# Patient Record
Sex: MALE | Race: WHITE | NOT HISPANIC OR LATINO
[De-identification: names, ages, dates, MRNs, and addresses within clinical notes are randomized per-mention and may not be internally consistent; named-entity substitution may affect disease eponyms.]

---

## 2017-01-09 ENCOUNTER — APPOINTMENT (OUTPATIENT)
Dept: OTOLARYNGOLOGY | Facility: CLINIC | Age: 42
End: 2017-01-09

## 2017-09-25 ENCOUNTER — APPOINTMENT (OUTPATIENT)
Dept: OTOLARYNGOLOGY | Facility: CLINIC | Age: 42
End: 2017-09-25
Payer: COMMERCIAL

## 2017-09-25 VITALS — DIASTOLIC BLOOD PRESSURE: 81 MMHG | OXYGEN SATURATION: 99 % | HEART RATE: 71 BPM | SYSTOLIC BLOOD PRESSURE: 118 MMHG

## 2017-09-25 PROCEDURE — 92557 COMPREHENSIVE HEARING TEST: CPT

## 2017-09-25 PROCEDURE — 92550 TYMPANOMETRY & REFLEX THRESH: CPT

## 2017-09-25 PROCEDURE — 99214 OFFICE O/P EST MOD 30 MIN: CPT

## 2019-01-23 ENCOUNTER — APPOINTMENT (OUTPATIENT)
Dept: OTOLARYNGOLOGY | Facility: CLINIC | Age: 44
End: 2019-01-23

## 2019-05-15 ENCOUNTER — APPOINTMENT (OUTPATIENT)
Dept: OTOLARYNGOLOGY | Facility: CLINIC | Age: 44
End: 2019-05-15
Payer: COMMERCIAL

## 2019-05-15 DIAGNOSIS — Z87.891 PERSONAL HISTORY OF NICOTINE DEPENDENCE: ICD-10-CM

## 2019-05-15 PROCEDURE — 99214 OFFICE O/P EST MOD 30 MIN: CPT | Mod: 25

## 2019-05-15 PROCEDURE — 92557 COMPREHENSIVE HEARING TEST: CPT

## 2019-05-15 PROCEDURE — 92567 TYMPANOMETRY: CPT

## 2019-06-04 NOTE — HISTORY OF PRESENT ILLNESS
[de-identified] : 43 yr old self-referred with meneires  - has had since age 7 - in left ear initially - went back and forth - several years ago episodes started in right ear - 2 months ago vertigo (4 episodes in past 10 years) - usually only 1x per year (6 hours - violently ill).  Hearing usually flux w/o vertigo - this past winter hearing up and down - also has allergies - uses allerga which helps - had allergy testing 10 years ago - stopped using steroids for hearing flux 6 years  - no hearing aid use - today is a good day for hearing.  No family hx of HL, vertigo, or AI disease.

## 2019-06-04 NOTE — DATA REVIEWED
[de-identified] : stable unilateral sensorineural hearing loss\par  [de-identified] : Ousmane left intracanilicular vestibular schwannoma last imaging 2017

## 2019-07-16 ENCOUNTER — APPOINTMENT (OUTPATIENT)
Dept: OTOLARYNGOLOGY | Facility: CLINIC | Age: 44
End: 2019-07-16
Payer: COMMERCIAL

## 2019-07-16 PROCEDURE — 92557 COMPREHENSIVE HEARING TEST: CPT

## 2019-07-16 PROCEDURE — 92567 TYMPANOMETRY: CPT

## 2019-10-24 ENCOUNTER — TRANSCRIPTION ENCOUNTER (OUTPATIENT)
Age: 44
End: 2019-10-24

## 2019-11-13 ENCOUNTER — APPOINTMENT (OUTPATIENT)
Dept: OTOLARYNGOLOGY | Facility: CLINIC | Age: 44
End: 2019-11-13
Payer: COMMERCIAL

## 2019-11-13 ENCOUNTER — TRANSCRIPTION ENCOUNTER (OUTPATIENT)
Age: 44
End: 2019-11-13

## 2019-11-13 PROCEDURE — 92557 COMPREHENSIVE HEARING TEST: CPT

## 2019-11-13 PROCEDURE — 99214 OFFICE O/P EST MOD 30 MIN: CPT | Mod: 25

## 2019-11-13 PROCEDURE — 92567 TYMPANOMETRY: CPT

## 2019-12-04 ENCOUNTER — APPOINTMENT (OUTPATIENT)
Dept: OTOLARYNGOLOGY | Facility: CLINIC | Age: 44
End: 2019-12-04
Payer: COMMERCIAL

## 2019-12-04 VITALS
DIASTOLIC BLOOD PRESSURE: 71 MMHG | HEART RATE: 71 BPM | SYSTOLIC BLOOD PRESSURE: 104 MMHG | BODY MASS INDEX: 24.34 KG/M2 | HEIGHT: 70 IN | WEIGHT: 170 LBS

## 2019-12-04 PROCEDURE — 92567 TYMPANOMETRY: CPT

## 2019-12-04 PROCEDURE — 92557 COMPREHENSIVE HEARING TEST: CPT

## 2019-12-04 PROCEDURE — 99214 OFFICE O/P EST MOD 30 MIN: CPT | Mod: 25

## 2019-12-04 RX ORDER — PREDNISONE 10 MG/1
10 TABLET ORAL
Qty: 70 | Refills: 0 | Status: DISCONTINUED | COMMUNITY
Start: 2019-11-13 | End: 2019-12-04

## 2019-12-15 NOTE — PHYSICAL EXAM
[Midline] : trachea located in midline position [Normal] : ocular motility and gaze are normal [de-identified] : right gaze nysyagmus

## 2019-12-15 NOTE — HISTORY OF PRESENT ILLNESS
[de-identified] : 44M here for f/u for Menieres. Pt with h/o unilateral sensorineural hearing loss and vestibular schwannoma -had- allergy eval for Meniere's Disease- triggers for Spring possible (pollen, grass). Having vertigo and drop in hearing in past 6 weeks- was on dyazide in past - ? benefit - no headaches - vertigo daily - couldn't walk straight or be upright. \par

## 2019-12-15 NOTE — HISTORY OF PRESENT ILLNESS
[de-identified] : 44M here for f/u for Menieres. Pt with h/o unilateral sensorineural hearing loss and left vestibular schwannoma -has had allergy eval for Meniere's Disease- pet allergy noted. S/p prednisone- finished course yesterday- hearing has been fluctuating- hearing currently is the worst it has ever been. Pt feels lightheaded now- tinnitus is worse now- feels a dial tone in the right ear. Pt denies any changes to the hearing in the left ear. Pt states he takes diuretic as prescribed. \par \par

## 2019-12-15 NOTE — DATA REVIEWED
[de-identified] : no change in left intracannilicular vestibular schwannoma  [de-identified] : worsening asymmetric SNHL

## 2020-01-02 ENCOUNTER — APPOINTMENT (OUTPATIENT)
Dept: OTOLARYNGOLOGY | Facility: CLINIC | Age: 45
End: 2020-01-02
Payer: COMMERCIAL

## 2020-01-02 PROCEDURE — 92557 COMPREHENSIVE HEARING TEST: CPT

## 2020-01-02 PROCEDURE — 99214 OFFICE O/P EST MOD 30 MIN: CPT | Mod: 25

## 2020-01-02 PROCEDURE — 92567 TYMPANOMETRY: CPT

## 2020-01-20 NOTE — HISTORY OF PRESENT ILLNESS
[de-identified] : 44M here for f/u for Menieres. Pt with h/o unilateral sensorineural hearing loss and left vestibular schwannoma. Pt f/u post steroid taper (finished today) due to Right ear with worse HL - MRI shows stable left schwannoma - continues on Dyazide.  \par \par Pt notes improvement in hearing- back to baseline. Pt notes no new vertigo since last visit.

## 2020-02-12 ENCOUNTER — APPOINTMENT (OUTPATIENT)
Dept: OTOLARYNGOLOGY | Facility: CLINIC | Age: 45
End: 2020-02-12
Payer: COMMERCIAL

## 2020-02-12 PROCEDURE — 92557 COMPREHENSIVE HEARING TEST: CPT

## 2020-02-12 PROCEDURE — 99214 OFFICE O/P EST MOD 30 MIN: CPT | Mod: 25

## 2020-02-12 PROCEDURE — 92567 TYMPANOMETRY: CPT

## 2020-02-25 ENCOUNTER — APPOINTMENT (OUTPATIENT)
Dept: OTOLARYNGOLOGY | Facility: CLINIC | Age: 45
End: 2020-02-25
Payer: COMMERCIAL

## 2020-02-25 PROCEDURE — 92557 COMPREHENSIVE HEARING TEST: CPT

## 2020-02-25 PROCEDURE — 92567 TYMPANOMETRY: CPT

## 2020-03-05 NOTE — DATA REVIEWED
[de-identified] : Otoscopy unremarkable Au. Pitch match: 45dBHL of pure tone at 500Hz in the right ear.\par Right ear: Moderate SNHL rising to normal hearing \par Left ear: Essentially mild to severe/moderately severe SNHL\par drop in hearing AD low frequencies\par Normal Type A tympanograms Au\par drop in hearing AD low frequency

## 2020-03-05 NOTE — HISTORY OF PRESENT ILLNESS
[de-identified] : 44M here for f/u for Menieres. Pt with h/o unilateral sensorineural hearing loss and left vestibular schwannoma. Pt continues on Dyazide and clonazepam (as needed).  Went skiing over weekend- drank a lot and sx worse - woke up with hum Monday morning - today better

## 2020-03-25 ENCOUNTER — APPOINTMENT (OUTPATIENT)
Dept: OTOLARYNGOLOGY | Facility: CLINIC | Age: 45
End: 2020-03-25
Payer: COMMERCIAL

## 2020-03-25 PROCEDURE — 99214 OFFICE O/P EST MOD 30 MIN: CPT | Mod: 25

## 2020-03-25 PROCEDURE — 92557 COMPREHENSIVE HEARING TEST: CPT

## 2020-03-25 PROCEDURE — 92567 TYMPANOMETRY: CPT

## 2020-03-25 NOTE — HISTORY OF PRESENT ILLNESS
[de-identified] : Loud tinnitus AD- finishing prednisone now - no vertigo - hearing worse - still on diuretic - has not d/c'd

## 2020-03-25 NOTE — DATA REVIEWED
[de-identified] : Right- rising moderate SNHL through 750Hz to WNL thereafter\par Left- -mild to severe sensorineural hearing loss\par Impedance testing reveals normal Type A tympanograms bilaterally\par no improvement AD\par \par \par \par

## 2020-04-06 ENCOUNTER — APPOINTMENT (OUTPATIENT)
Dept: OTOLARYNGOLOGY | Facility: CLINIC | Age: 45
End: 2020-04-06
Payer: COMMERCIAL

## 2020-04-06 VITALS — SYSTOLIC BLOOD PRESSURE: 128 MMHG | DIASTOLIC BLOOD PRESSURE: 76 MMHG | HEART RATE: 91 BPM

## 2020-04-06 VITALS — TEMPERATURE: 98.5 F

## 2020-04-06 PROCEDURE — 92557 COMPREHENSIVE HEARING TEST: CPT

## 2020-04-06 PROCEDURE — 92567 TYMPANOMETRY: CPT

## 2020-04-06 PROCEDURE — 99213 OFFICE O/P EST LOW 20 MIN: CPT | Mod: 25

## 2020-04-07 NOTE — DATA REVIEWED
[de-identified] : Mild SNHL to 500Hz rising to WNL from 750Hz-8kHz, AD\par Essentially mild sloping to moderate-severe SNHL, AS\par improved AD

## 2020-04-07 NOTE — HISTORY OF PRESENT ILLNESS
[de-identified] : 44M here for f/u for hearing- pt continues on longer prednisone taper- having loud tinnitus AD, didn't feel hearing was better post prednisone during last office visit. Pt continues on Dyazide. Pt notes hearing is fluctuating still- worst when waking up- ok to fair during the day time. Pt feels overall that hearing is better from 10 days ago. Pt denies any vertigo.

## 2020-04-07 NOTE — DATA REVIEWED
[de-identified] : Mild SNHL to 500Hz rising to WNL from 750Hz-8kHz, AD\par Essentially mild sloping to moderate-severe SNHL, AS\par improved AD

## 2020-04-07 NOTE — HISTORY OF PRESENT ILLNESS
[de-identified] : 44M here for f/u for hearing- pt continues on longer prednisone taper- having loud tinnitus AD, didn't feel hearing was better post prednisone during last office visit. Pt continues on Dyazide. Pt notes hearing is fluctuating still- worst when waking up- ok to fair during the day time. Pt feels overall that hearing is better from 10 days ago. Pt denies any vertigo.

## 2020-04-27 ENCOUNTER — APPOINTMENT (OUTPATIENT)
Dept: OTOLARYNGOLOGY | Facility: CLINIC | Age: 45
End: 2020-04-27
Payer: COMMERCIAL

## 2020-04-27 VITALS — TEMPERATURE: 98 F

## 2020-04-27 PROCEDURE — 92557 COMPREHENSIVE HEARING TEST: CPT

## 2020-04-27 PROCEDURE — 92567 TYMPANOMETRY: CPT

## 2020-04-27 PROCEDURE — 99213 OFFICE O/P EST LOW 20 MIN: CPT | Mod: 25

## 2020-06-23 ENCOUNTER — TRANSCRIPTION ENCOUNTER (OUTPATIENT)
Age: 45
End: 2020-06-23

## 2020-06-24 ENCOUNTER — TRANSCRIPTION ENCOUNTER (OUTPATIENT)
Age: 45
End: 2020-06-24

## 2020-07-07 ENCOUNTER — TRANSCRIPTION ENCOUNTER (OUTPATIENT)
Age: 45
End: 2020-07-07

## 2020-07-09 ENCOUNTER — APPOINTMENT (OUTPATIENT)
Dept: OTOLARYNGOLOGY | Facility: CLINIC | Age: 45
End: 2020-07-09
Payer: COMMERCIAL

## 2020-07-09 VITALS — HEART RATE: 96 BPM | SYSTOLIC BLOOD PRESSURE: 113 MMHG | DIASTOLIC BLOOD PRESSURE: 77 MMHG

## 2020-07-09 PROCEDURE — 92567 TYMPANOMETRY: CPT

## 2020-07-09 PROCEDURE — 92557 COMPREHENSIVE HEARING TEST: CPT

## 2020-07-09 PROCEDURE — 99214 OFFICE O/P EST MOD 30 MIN: CPT | Mod: 25

## 2020-07-16 ENCOUNTER — TRANSCRIPTION ENCOUNTER (OUTPATIENT)
Age: 45
End: 2020-07-16

## 2020-07-20 NOTE — DATA REVIEWED
[de-identified] : Right ear: Moderate SNHL rising to normal hearing from 250Hz-8kHz\par Left ear: Essentially moderate to severe SNHL from 250Hz-8kHz\par Normal Type A tymps Au

## 2020-07-20 NOTE — HISTORY OF PRESENT ILLNESS
[de-identified] : 44M here for f/u for hearing- hx of Menieres- continues on Dyazide - feel hearing is ok- hum that started on Saturday- due to stress- denies any vertigo. Stopped allegra and changed to claritin - now new hum AD - no vertigo -

## 2020-08-05 ENCOUNTER — APPOINTMENT (OUTPATIENT)
Dept: OTOLARYNGOLOGY | Facility: CLINIC | Age: 45
End: 2020-08-05
Payer: COMMERCIAL

## 2020-08-05 VITALS — SYSTOLIC BLOOD PRESSURE: 103 MMHG | DIASTOLIC BLOOD PRESSURE: 67 MMHG | HEART RATE: 74 BPM

## 2020-08-05 PROCEDURE — 99213 OFFICE O/P EST LOW 20 MIN: CPT | Mod: 25

## 2020-08-05 PROCEDURE — 92557 COMPREHENSIVE HEARING TEST: CPT

## 2020-08-05 PROCEDURE — 92567 TYMPANOMETRY: CPT

## 2020-08-05 RX ORDER — PREDNISONE 10 MG/1
10 TABLET ORAL
Qty: 120 | Refills: 0 | Status: DISCONTINUED | COMMUNITY
Start: 2020-02-26 | End: 2020-08-05

## 2020-08-05 RX ORDER — PREDNISONE 10 MG/1
10 TABLET ORAL
Qty: 120 | Refills: 0 | Status: DISCONTINUED | COMMUNITY
Start: 2020-03-25 | End: 2020-08-05

## 2020-08-05 RX ORDER — PREDNISONE 10 MG/1
10 TABLET ORAL
Qty: 70 | Refills: 1 | Status: DISCONTINUED | COMMUNITY
Start: 2020-07-09 | End: 2020-08-05

## 2020-08-05 RX ORDER — PREDNISONE 10 MG/1
10 TABLET ORAL
Qty: 120 | Refills: 0 | Status: DISCONTINUED | COMMUNITY
Start: 2019-12-04 | End: 2020-08-05

## 2020-08-05 RX ORDER — PREDNISONE 10 MG/1
10 TABLET ORAL
Qty: 70 | Refills: 0 | Status: DISCONTINUED | COMMUNITY
Start: 2020-02-12 | End: 2020-08-05

## 2020-08-05 NOTE — HISTORY OF PRESENT ILLNESS
[de-identified] : 45M here for hearing- feels hearing is stable- denies otalgia, otorrhea, ear infections or vertigo. \par

## 2020-08-05 NOTE — DATA REVIEWED
[de-identified] : RT: WNL with excellent SRS\par LT: mild/WNL to mod-severe SNHL with good SRS\par Normal type A tymps bilaterally

## 2020-11-11 ENCOUNTER — APPOINTMENT (OUTPATIENT)
Dept: OTOLARYNGOLOGY | Facility: CLINIC | Age: 45
End: 2020-11-11
Payer: COMMERCIAL

## 2020-11-11 PROCEDURE — 99072 ADDL SUPL MATRL&STAF TM PHE: CPT

## 2020-11-11 PROCEDURE — 99214 OFFICE O/P EST MOD 30 MIN: CPT | Mod: 25

## 2020-11-11 PROCEDURE — 92567 TYMPANOMETRY: CPT

## 2020-11-11 PROCEDURE — 92557 COMPREHENSIVE HEARING TEST: CPT

## 2020-11-12 NOTE — DATA REVIEWED
[de-identified] : Mild to moderately-severe sensorineural hearing loss with the exception of normal hearing at 500Hz and 750Hz, AS. Hearing within normal limits, AD. [de-identified] : [id number]

## 2021-02-12 ENCOUNTER — OUTPATIENT (OUTPATIENT)
Dept: OUTPATIENT SERVICES | Facility: HOSPITAL | Age: 46
LOS: 1 days | End: 2021-02-12
Payer: COMMERCIAL

## 2021-02-12 DIAGNOSIS — Z20.828 CONTACT WITH AND (SUSPECTED) EXPOSURE TO OTHER VIRAL COMMUNICABLE DISEASES: ICD-10-CM

## 2021-02-12 LAB — SARS-COV-2 RNA SPEC QL NAA+PROBE: SIGNIFICANT CHANGE UP

## 2021-02-12 PROCEDURE — U0003: CPT

## 2021-02-12 PROCEDURE — C9803: CPT

## 2021-02-12 PROCEDURE — U0005: CPT

## 2021-02-13 DIAGNOSIS — Z20.828 CONTACT WITH AND (SUSPECTED) EXPOSURE TO OTHER VIRAL COMMUNICABLE DISEASES: ICD-10-CM

## 2021-02-24 ENCOUNTER — APPOINTMENT (OUTPATIENT)
Dept: OTOLARYNGOLOGY | Facility: CLINIC | Age: 46
End: 2021-02-24
Payer: COMMERCIAL

## 2021-02-24 PROCEDURE — 99072 ADDL SUPL MATRL&STAF TM PHE: CPT

## 2021-02-24 PROCEDURE — 92567 TYMPANOMETRY: CPT

## 2021-02-24 PROCEDURE — 92557 COMPREHENSIVE HEARING TEST: CPT

## 2021-02-24 NOTE — HISTORY OF PRESENT ILLNESS
[de-identified] : 45M f/u for hearing-  hx of left VS and SSNHL in right ear- continues with Dyazide???  Had episode of vertigo following 103 fever.

## 2021-03-15 ENCOUNTER — TRANSCRIPTION ENCOUNTER (OUTPATIENT)
Age: 46
End: 2021-03-15

## 2021-03-24 ENCOUNTER — TRANSCRIPTION ENCOUNTER (OUTPATIENT)
Age: 46
End: 2021-03-24

## 2021-04-06 ENCOUNTER — TRANSCRIPTION ENCOUNTER (OUTPATIENT)
Age: 46
End: 2021-04-06

## 2021-04-07 ENCOUNTER — NON-APPOINTMENT (OUTPATIENT)
Age: 46
End: 2021-04-07

## 2021-04-07 ENCOUNTER — APPOINTMENT (OUTPATIENT)
Dept: OTOLARYNGOLOGY | Facility: CLINIC | Age: 46
End: 2021-04-07

## 2021-05-10 ENCOUNTER — APPOINTMENT (OUTPATIENT)
Dept: OTOLARYNGOLOGY | Facility: CLINIC | Age: 46
End: 2021-05-10
Payer: COMMERCIAL

## 2021-05-10 VITALS — DIASTOLIC BLOOD PRESSURE: 67 MMHG | HEART RATE: 73 BPM | SYSTOLIC BLOOD PRESSURE: 100 MMHG

## 2021-05-10 DIAGNOSIS — J30.1 ALLERGIC RHINITIS DUE TO POLLEN: ICD-10-CM

## 2021-05-10 PROCEDURE — 92557 COMPREHENSIVE HEARING TEST: CPT

## 2021-05-10 PROCEDURE — 92567 TYMPANOMETRY: CPT

## 2021-05-10 PROCEDURE — 99213 OFFICE O/P EST LOW 20 MIN: CPT | Mod: 25

## 2021-05-10 PROCEDURE — 99072 ADDL SUPL MATRL&STAF TM PHE: CPT

## 2021-05-10 RX ORDER — PREDNISONE 10 MG/1
10 TABLET ORAL
Qty: 70 | Refills: 0 | Status: COMPLETED | COMMUNITY
Start: 2021-04-07 | End: 2021-05-10

## 2021-05-18 ENCOUNTER — APPOINTMENT (OUTPATIENT)
Dept: OTOLARYNGOLOGY | Facility: CLINIC | Age: 46
End: 2021-05-18
Payer: COMMERCIAL

## 2021-05-18 PROCEDURE — 92557 COMPREHENSIVE HEARING TEST: CPT

## 2021-05-18 PROCEDURE — 99072 ADDL SUPL MATRL&STAF TM PHE: CPT

## 2021-05-18 PROCEDURE — 92567 TYMPANOMETRY: CPT

## 2021-05-27 NOTE — HISTORY OF PRESENT ILLNESS
[de-identified] : 45M f/u for hearing drop 7 days post 2nd covid vaccine (Pfizer) and right tinnitus AD - started prednisone taper- finished about 2 weeks ago- notes improvement in hearing- notes low-hum in right ear (always present for the last week)- feels allergies are the worst this season- just taking Allegra. Continues with Dyazide and NAC. Feels back to baseline except allergies

## 2021-07-19 ENCOUNTER — APPOINTMENT (OUTPATIENT)
Dept: OTOLARYNGOLOGY | Facility: CLINIC | Age: 46
End: 2021-07-19
Payer: COMMERCIAL

## 2021-07-19 VITALS — SYSTOLIC BLOOD PRESSURE: 125 MMHG | HEART RATE: 82 BPM | DIASTOLIC BLOOD PRESSURE: 88 MMHG

## 2021-07-19 PROCEDURE — 92557 COMPREHENSIVE HEARING TEST: CPT

## 2021-07-19 PROCEDURE — 99214 OFFICE O/P EST MOD 30 MIN: CPT | Mod: 25

## 2021-07-19 PROCEDURE — 99072 ADDL SUPL MATRL&STAF TM PHE: CPT

## 2021-07-19 PROCEDURE — 92567 TYMPANOMETRY: CPT

## 2021-07-19 RX ORDER — MONTELUKAST 10 MG/1
10 TABLET, FILM COATED ORAL DAILY
Qty: 90 | Refills: 3 | Status: DISCONTINUED | COMMUNITY
Start: 2021-05-10 | End: 2021-07-19

## 2021-07-19 NOTE — DATA REVIEWED
[de-identified] : AS: Essentially mild to moderately severe SNHL\par AD: Moderate SNHL rising to WNL (decrease in low freq since previous eval)

## 2021-08-04 ENCOUNTER — APPOINTMENT (OUTPATIENT)
Dept: OTOLARYNGOLOGY | Facility: CLINIC | Age: 46
End: 2021-08-04
Payer: COMMERCIAL

## 2021-08-04 PROCEDURE — 99213 OFFICE O/P EST LOW 20 MIN: CPT | Mod: 25

## 2021-08-04 PROCEDURE — 92567 TYMPANOMETRY: CPT

## 2021-08-04 PROCEDURE — 92557 COMPREHENSIVE HEARING TEST: CPT

## 2021-08-10 ENCOUNTER — NON-APPOINTMENT (OUTPATIENT)
Age: 46
End: 2021-08-10

## 2021-08-10 LAB
ANION GAP SERPL CALC-SCNC: 15 MMOL/L
BUN SERPL-MCNC: 22 MG/DL
CALCIUM SERPL-MCNC: 10 MG/DL
CHLORIDE SERPL-SCNC: 100 MMOL/L
CO2 SERPL-SCNC: 25 MMOL/L
CREAT SERPL-MCNC: 1.09 MG/DL
GLUCOSE SERPL-MCNC: 53 MG/DL
POTASSIUM SERPL-SCNC: 4.1 MMOL/L
SODIUM SERPL-SCNC: 140 MMOL/L
TRIGL SERPL-MCNC: 462 MG/DL

## 2021-08-19 NOTE — HISTORY OF PRESENT ILLNESS
[de-identified] : 46M f/u for right SSNHL - hx of left vestibular schwannoma- trial of prednisone  frequent nausea - every couple of days - continues on Dyazide - [id number]

## 2021-09-07 ENCOUNTER — RX RENEWAL (OUTPATIENT)
Age: 46
End: 2021-09-07

## 2021-11-17 ENCOUNTER — APPOINTMENT (OUTPATIENT)
Dept: OTOLARYNGOLOGY | Facility: CLINIC | Age: 46
End: 2021-11-17
Payer: COMMERCIAL

## 2021-11-17 VITALS — HEART RATE: 67 BPM | DIASTOLIC BLOOD PRESSURE: 74 MMHG | SYSTOLIC BLOOD PRESSURE: 107 MMHG

## 2021-11-17 PROCEDURE — 92567 TYMPANOMETRY: CPT

## 2021-11-17 PROCEDURE — 92557 COMPREHENSIVE HEARING TEST: CPT

## 2021-11-17 PROCEDURE — 99214 OFFICE O/P EST MOD 30 MIN: CPT | Mod: 25

## 2021-11-17 RX ORDER — PREDNISONE 10 MG/1
10 TABLET ORAL
Qty: 70 | Refills: 0 | Status: COMPLETED | COMMUNITY
Start: 2021-07-19 | End: 2021-11-17

## 2021-11-24 ENCOUNTER — NON-APPOINTMENT (OUTPATIENT)
Age: 46
End: 2021-11-24

## 2021-11-24 ENCOUNTER — TRANSCRIPTION ENCOUNTER (OUTPATIENT)
Age: 46
End: 2021-11-24

## 2021-12-01 ENCOUNTER — NON-APPOINTMENT (OUTPATIENT)
Age: 46
End: 2021-12-01

## 2021-12-01 ENCOUNTER — TRANSCRIPTION ENCOUNTER (OUTPATIENT)
Age: 46
End: 2021-12-01

## 2021-12-02 ENCOUNTER — TRANSCRIPTION ENCOUNTER (OUTPATIENT)
Age: 46
End: 2021-12-02

## 2021-12-04 NOTE — HISTORY OF PRESENT ILLNESS
[de-identified] : 45 yr old returns for follow up - hx of left VS and SSNHL in right ear-  since last visit in August no noticeable change in hearing- no c/o vertigo.  
1% lidocaine

## 2021-12-08 ENCOUNTER — APPOINTMENT (OUTPATIENT)
Dept: OTOLARYNGOLOGY | Facility: CLINIC | Age: 46
End: 2021-12-08

## 2021-12-08 ENCOUNTER — APPOINTMENT (OUTPATIENT)
Dept: OTOLARYNGOLOGY | Facility: CLINIC | Age: 46
End: 2021-12-08
Payer: COMMERCIAL

## 2021-12-08 VITALS
SYSTOLIC BLOOD PRESSURE: 122 MMHG | DIASTOLIC BLOOD PRESSURE: 85 MMHG | BODY MASS INDEX: 25.05 KG/M2 | WEIGHT: 175 LBS | HEART RATE: 64 BPM | HEIGHT: 70 IN

## 2021-12-08 DIAGNOSIS — H90.41 SENSORINEURAL HEARING LOSS, UNILATERAL, RIGHT EAR, WITH UNRESTRICTED HEARING ON THE CONTRALATERAL SIDE: ICD-10-CM

## 2021-12-08 PROCEDURE — 92567 TYMPANOMETRY: CPT

## 2021-12-08 PROCEDURE — 92557 COMPREHENSIVE HEARING TEST: CPT

## 2021-12-08 PROCEDURE — 99213 OFFICE O/P EST LOW 20 MIN: CPT | Mod: 25

## 2021-12-08 NOTE — HISTORY OF PRESENT ILLNESS
[de-identified] : 46 year old male follow up for Right sudden SNHL, s/p oral Prednisone taper, restarted on betahistine, steroid taper extended, taking medications as prescribed with good relief.  Reports doing much better, hearing fully restored.  No change with tinnitus, mild and non bothersome.  Denies otalgia, otorrhea, dizziness, vertigo, headaches, recent fevers and ear infections.  Denies severe side effects with steroid regimen.

## 2021-12-08 NOTE — HISTORY OF PRESENT ILLNESS
[de-identified] : 46M f/u for right SSNHL - hx of left vestibular schwannoma- added BetaHistine- tried it for 2 months but came off it - not nauseous anymore- votes having taken COVID-19 booster 2 weeks and after started having loud noise in the right ear- stress is manageable - continues on Dyazide and NAC.

## 2021-12-08 NOTE — DATA REVIEWED
[de-identified] : Moderate-severe rising to mild SNHL to 1kHz rising to WNL through 8kHz, AD\par Essentially mild to severe SNHL, AS\par Type A tymp, AU

## 2021-12-08 NOTE — DATA REVIEWED
[de-identified] : Right- hearing WNL\par Left- -mild to severe sensorineural hearing loss\par Impedance testing reveals normal Type A tympanograms bilaterally\par

## 2022-01-06 ENCOUNTER — APPOINTMENT (OUTPATIENT)
Dept: OTOLARYNGOLOGY | Facility: CLINIC | Age: 47
End: 2022-01-06
Payer: COMMERCIAL

## 2022-01-06 PROCEDURE — 99214 OFFICE O/P EST MOD 30 MIN: CPT | Mod: 25

## 2022-01-06 PROCEDURE — 92557 COMPREHENSIVE HEARING TEST: CPT

## 2022-01-06 PROCEDURE — 92567 TYMPANOMETRY: CPT

## 2022-01-09 NOTE — HISTORY OF PRESENT ILLNESS
[de-identified] : Patient recently noticed drop in hearing again - likely related to diet over holidays - denies vertigo

## 2022-01-09 NOTE — DATA REVIEWED
[de-identified] : right- moderate, rising to a mild sensorineural hearing loss through 750Hz to WNL thereafter\par Left- -mild to severe sensorineural hearing loss\par Impedance testing reveals normal Type A tympanograms bilaterally\par

## 2022-01-11 ENCOUNTER — TRANSCRIPTION ENCOUNTER (OUTPATIENT)
Age: 47
End: 2022-01-11

## 2022-01-13 ENCOUNTER — TRANSCRIPTION ENCOUNTER (OUTPATIENT)
Age: 47
End: 2022-01-13

## 2022-01-26 ENCOUNTER — APPOINTMENT (OUTPATIENT)
Dept: OTOLARYNGOLOGY | Facility: CLINIC | Age: 47
End: 2022-01-26
Payer: COMMERCIAL

## 2022-01-26 VITALS
SYSTOLIC BLOOD PRESSURE: 114 MMHG | DIASTOLIC BLOOD PRESSURE: 76 MMHG | HEART RATE: 89 BPM | HEIGHT: 70 IN | BODY MASS INDEX: 25.05 KG/M2 | WEIGHT: 175 LBS

## 2022-01-26 PROCEDURE — 92557 COMPREHENSIVE HEARING TEST: CPT

## 2022-01-26 PROCEDURE — 92567 TYMPANOMETRY: CPT

## 2022-01-26 PROCEDURE — 92625 TINNITUS ASSESSMENT: CPT

## 2022-01-26 PROCEDURE — 99214 OFFICE O/P EST MOD 30 MIN: CPT

## 2022-01-28 ENCOUNTER — RESULT REVIEW (OUTPATIENT)
Age: 47
End: 2022-01-28

## 2022-02-02 ENCOUNTER — APPOINTMENT (OUTPATIENT)
Dept: MRI IMAGING | Facility: CLINIC | Age: 47
End: 2022-02-02
Payer: COMMERCIAL

## 2022-02-02 ENCOUNTER — OUTPATIENT (OUTPATIENT)
Dept: OUTPATIENT SERVICES | Facility: HOSPITAL | Age: 47
LOS: 1 days | End: 2022-02-02
Payer: COMMERCIAL

## 2022-02-02 DIAGNOSIS — H90.5 UNSPECIFIED SENSORINEURAL HEARING LOSS: ICD-10-CM

## 2022-02-02 DIAGNOSIS — H93.A3 PULSATILE TINNITUS, BILATERAL: ICD-10-CM

## 2022-02-02 DIAGNOSIS — H93.11 TINNITUS, RIGHT EAR: ICD-10-CM

## 2022-02-02 PROCEDURE — 70546 MR ANGIOGRAPH HEAD W/O&W/DYE: CPT

## 2022-02-02 PROCEDURE — 70546 MR ANGIOGRAPH HEAD W/O&W/DYE: CPT | Mod: 26,59

## 2022-02-02 PROCEDURE — A9585: CPT

## 2022-02-02 PROCEDURE — 70553 MRI BRAIN STEM W/O & W/DYE: CPT

## 2022-02-02 PROCEDURE — 70553 MRI BRAIN STEM W/O & W/DYE: CPT | Mod: 26

## 2022-02-06 NOTE — HISTORY OF PRESENT ILLNESS
[de-identified] : 46 year old male follow up hearing loss. Feels it is worse than ever before tinnitus worse than ever.  Nausea, but no vertigo - ho headache.  + fatigue.

## 2022-02-06 NOTE — DATA REVIEWED
[de-identified] : RE: Mild SNHL at 250 Hz, rising to hearing WNL.\par LE: Essentially a mild to moderately severe SNHL.\par Type A Tymp, Au.

## 2022-02-09 ENCOUNTER — NON-APPOINTMENT (OUTPATIENT)
Age: 47
End: 2022-02-09

## 2022-02-14 ENCOUNTER — NON-APPOINTMENT (OUTPATIENT)
Age: 47
End: 2022-02-14

## 2022-03-09 ENCOUNTER — APPOINTMENT (OUTPATIENT)
Dept: OTOLARYNGOLOGY | Facility: CLINIC | Age: 47
End: 2022-03-09
Payer: COMMERCIAL

## 2022-03-09 VITALS
WEIGHT: 175 LBS | HEIGHT: 70 IN | SYSTOLIC BLOOD PRESSURE: 128 MMHG | BODY MASS INDEX: 25.05 KG/M2 | HEART RATE: 77 BPM | DIASTOLIC BLOOD PRESSURE: 83 MMHG

## 2022-03-09 PROCEDURE — 92557 COMPREHENSIVE HEARING TEST: CPT

## 2022-03-09 PROCEDURE — 99213 OFFICE O/P EST LOW 20 MIN: CPT

## 2022-03-09 PROCEDURE — 92567 TYMPANOMETRY: CPT

## 2022-03-09 PROCEDURE — 92584 ELECTROCOCHLEOGRAPHY: CPT

## 2022-03-09 RX ORDER — ACETYLCYSTEINE 600 MG
600 CAPSULE ORAL DAILY
Qty: 90 | Refills: 3 | Status: COMPLETED | COMMUNITY
Start: 2019-12-04 | End: 2022-03-09

## 2022-03-09 RX ORDER — ACETAZOLAMIDE 125 MG/1
125 TABLET ORAL
Qty: 28 | Refills: 0 | Status: COMPLETED | COMMUNITY
Start: 2022-02-09 | End: 2022-03-09

## 2022-03-09 RX ORDER — PREDNISONE 10 MG/1
10 TABLET ORAL
Qty: 70 | Refills: 0 | Status: COMPLETED | COMMUNITY
Start: 2022-01-06 | End: 2022-03-09

## 2022-03-09 RX ORDER — PREDNISONE 10 MG/1
10 TABLET ORAL
Qty: 70 | Refills: 0 | Status: COMPLETED | COMMUNITY
Start: 2022-01-12 | End: 2022-03-09

## 2022-03-09 RX ORDER — ACETAZOLAMIDE 250 MG/1
250 TABLET ORAL
Qty: 180 | Refills: 1 | Status: COMPLETED | COMMUNITY
Start: 2022-02-09 | End: 2022-03-09

## 2022-03-09 RX ORDER — METHOCARBAMOL 750 MG/1
750 TABLET, FILM COATED ORAL
Qty: 30 | Refills: 0 | Status: COMPLETED | COMMUNITY
Start: 2021-06-17 | End: 2022-03-09

## 2022-03-09 RX ORDER — FEXOFENADINE HCL 60 MG
CAPSULE ORAL
Refills: 0 | Status: COMPLETED | COMMUNITY
End: 2022-03-09

## 2022-03-09 RX ORDER — CLONAZEPAM 0.5 MG/1
0.5 TABLET ORAL
Qty: 60 | Refills: 0 | Status: COMPLETED | COMMUNITY
Start: 2019-12-04 | End: 2022-03-09

## 2022-03-09 RX ORDER — MELOXICAM 15 MG/1
15 TABLET ORAL
Qty: 30 | Refills: 0 | Status: COMPLETED | COMMUNITY
Start: 2021-07-06 | End: 2022-03-09

## 2022-03-28 ENCOUNTER — TRANSCRIPTION ENCOUNTER (OUTPATIENT)
Age: 47
End: 2022-03-28

## 2022-03-28 ENCOUNTER — NON-APPOINTMENT (OUTPATIENT)
Age: 47
End: 2022-03-28

## 2022-04-20 NOTE — DATA REVIEWED
[de-identified] : Right- mild HL at 125Hz otherwise WNL\par Left -mild to severe sensorineural hearing loss\par Impedance testing reveals normal Type A tympanograms bilaterally\par Ecog\par Right- 31%\par Left- 40%

## 2022-04-20 NOTE — HISTORY OF PRESENT ILLNESS
[de-identified] : 45 yo M with intermittent tinnitus in right ear.  Currently does not have tinnitus and no significant hearing changes. Completed last course of prednisone and 2 weeks ago stopped taking all medication after trial of Diamox - sign sfx to medication. Controlling sodium intake and minimal caffeine. Had MRI with normal IAC. In am tinnitus worse - has nausea, has used bonine 8x in past month - occasional headaches - hasn't been able to tlk on phone -

## 2022-06-08 ENCOUNTER — APPOINTMENT (OUTPATIENT)
Dept: OTOLARYNGOLOGY | Facility: CLINIC | Age: 47
End: 2022-06-08

## 2022-07-05 ENCOUNTER — NON-APPOINTMENT (OUTPATIENT)
Age: 47
End: 2022-07-05

## 2022-07-11 ENCOUNTER — NON-APPOINTMENT (OUTPATIENT)
Age: 47
End: 2022-07-11

## 2022-07-20 ENCOUNTER — APPOINTMENT (OUTPATIENT)
Dept: OTOLARYNGOLOGY | Facility: CLINIC | Age: 47
End: 2022-07-20

## 2022-08-03 ENCOUNTER — APPOINTMENT (OUTPATIENT)
Dept: OTOLARYNGOLOGY | Facility: CLINIC | Age: 47
End: 2022-08-03

## 2022-08-03 VITALS — WEIGHT: 175 LBS | HEIGHT: 70 IN | BODY MASS INDEX: 25.05 KG/M2

## 2022-08-03 PROCEDURE — 99214 OFFICE O/P EST MOD 30 MIN: CPT

## 2022-08-03 PROCEDURE — 92567 TYMPANOMETRY: CPT

## 2022-08-03 PROCEDURE — 92557 COMPREHENSIVE HEARING TEST: CPT

## 2022-08-03 RX ORDER — PREDNISONE 10 MG/1
10 TABLET ORAL DAILY
Qty: 42 | Refills: 0 | Status: DISCONTINUED | COMMUNITY
Start: 2022-07-11 | End: 2022-08-03

## 2022-08-03 RX ORDER — PREDNISONE 10 MG/1
10 TABLET ORAL
Qty: 70 | Refills: 0 | Status: DISCONTINUED | COMMUNITY
Start: 2022-03-28 | End: 2022-08-03

## 2022-08-12 LAB
ANION GAP SERPL CALC-SCNC: 14 MMOL/L
BUN SERPL-MCNC: 19 MG/DL
CALCIUM SERPL-MCNC: 9.9 MG/DL
CHLORIDE SERPL-SCNC: 100 MMOL/L
CO2 SERPL-SCNC: 24 MMOL/L
CREAT SERPL-MCNC: 1.08 MG/DL
CRP SERPL-MCNC: <3 MG/L
EGFR: 85 ML/MIN/1.73M2
ERYTHROCYTE [SEDIMENTATION RATE] IN BLOOD BY WESTERGREN METHOD: 10 MM/HR
GLUCOSE SERPL-MCNC: 47 MG/DL
POTASSIUM SERPL-SCNC: 4.2 MMOL/L
SODIUM SERPL-SCNC: 137 MMOL/L
TRIGL SERPL-MCNC: 191 MG/DL
TRYPTASE: 5.8 UG/L

## 2022-08-14 NOTE — HISTORY OF PRESENT ILLNESS
Patient: Janet Ram    Procedure: Procedure(s):  colonoscopy aborted due to poor prep       Diagnosis:History of colonic polyps [Z86.010]  Diagnosis Additional Information: No value filed.    Anesthesia Type:  MAC    Note:  Disposition: Outpatient   Postop Pain Control: Uneventful            Sign Out: Well controlled pain   PONV: No   Neuro/Psych: Uneventful            Sign Out: Acceptable/Baseline neuro status   Airway/Respiratory: Uneventful            Sign Out: Acceptable/Baseline resp. status   CV/Hemodynamics: Uneventful            Sign Out: Acceptable CV status   Other NRE: NONE   DID A NON-ROUTINE EVENT OCCUR? No    Event details/Postop Comments:  Dr. Arevalo unable to finish procedure because of poor prep, he will try and rescheduled for tomorrow. Pt was happy with anesthesia care.  No complications.  I will follow up with the pt if needed.           Last vitals:  Vitals Value Taken Time   /77 11/15/21 1230   Temp     Pulse 65 11/15/21 1230   Resp     SpO2 99 % 11/15/21 1226   Vitals shown include unvalidated device data.    Electronically Signed By: PHILIP Zhang CRNA  November 15, 2021  12:44 PM  
[de-identified] : 46 yo M with decreased hearing and tinnitus AD for 6-7 weeks despite prednisone taper. Was given prednisone taper (60 mg x 14 days) with no improvement in symptoms. No otalgia, otorrhea, ear infections, dizziness or headaches.

## 2022-08-14 NOTE — DATA REVIEWED
[de-identified] : AD: Essentially mild SNHL .125-.75kHz rising to hearing WNL 1-8kHz.\par AS: Mild SNHL .125-.25kHz rising to WNL .5-.75kHz sloping back to a mild to moderately-severe SNHl 1-8kHz.

## 2022-08-24 ENCOUNTER — APPOINTMENT (OUTPATIENT)
Dept: OTOLARYNGOLOGY | Facility: CLINIC | Age: 47
End: 2022-08-24

## 2022-08-24 PROCEDURE — 99214 OFFICE O/P EST MOD 30 MIN: CPT

## 2022-08-24 PROCEDURE — 92557 COMPREHENSIVE HEARING TEST: CPT

## 2022-08-24 PROCEDURE — 92567 TYMPANOMETRY: CPT

## 2022-09-01 NOTE — DATA REVIEWED
[de-identified] : AD: Moderately-severe SNHL rising to hearing WNL .125-8kHz.\par AS: Mild SNHL rising to hearing WNL sloping to an essentially moderately-severe SNHL .125-8kHz.

## 2022-09-01 NOTE — HISTORY OF PRESENT ILLNESS
[de-identified] : Patient was doing handstands in pool and then sx worse last 4 days  - since tinnitus worse - currently at 20mg of taper  - no vertigo --

## 2022-09-14 ENCOUNTER — APPOINTMENT (OUTPATIENT)
Dept: OTOLARYNGOLOGY | Facility: CLINIC | Age: 47
End: 2022-09-14

## 2022-10-06 ENCOUNTER — NON-APPOINTMENT (OUTPATIENT)
Age: 47
End: 2022-10-06

## 2022-10-11 ENCOUNTER — TRANSCRIPTION ENCOUNTER (OUTPATIENT)
Age: 47
End: 2022-10-11

## 2022-10-12 ENCOUNTER — APPOINTMENT (OUTPATIENT)
Dept: OTOLARYNGOLOGY | Facility: CLINIC | Age: 47
End: 2022-10-12

## 2022-10-12 DIAGNOSIS — H81.01 MENIERE'S DISEASE, RIGHT EAR: ICD-10-CM

## 2022-10-12 PROCEDURE — 92557 COMPREHENSIVE HEARING TEST: CPT

## 2022-10-12 PROCEDURE — 92567 TYMPANOMETRY: CPT

## 2022-10-12 PROCEDURE — 99214 OFFICE O/P EST MOD 30 MIN: CPT

## 2022-11-07 NOTE — HISTORY OF PRESENT ILLNESS
[de-identified] : Cuurently on 60mg for recent drop - taking diuretic - also gluten free  - no vertigo  - right sided tinnitus better but not gone -

## 2022-11-07 NOTE — DATA REVIEWED
[de-identified] : AD: Mild to moderate SNHL .125-.75kHz rising to hearing WNL 1-8kHz.\par AS: Moderate SNHL .125-.25 rising to WNL .5-.75kHz sloping to a mild to moderately-severe SNHL 1-8kHz.

## 2022-11-09 ENCOUNTER — APPOINTMENT (OUTPATIENT)
Dept: OTOLARYNGOLOGY | Facility: CLINIC | Age: 47
End: 2022-11-09

## 2022-11-09 VITALS
HEART RATE: 70 BPM | WEIGHT: 180 LBS | SYSTOLIC BLOOD PRESSURE: 118 MMHG | HEIGHT: 70 IN | BODY MASS INDEX: 25.77 KG/M2 | DIASTOLIC BLOOD PRESSURE: 74 MMHG

## 2022-11-09 PROCEDURE — 99214 OFFICE O/P EST MOD 30 MIN: CPT

## 2022-11-09 PROCEDURE — 92557 COMPREHENSIVE HEARING TEST: CPT

## 2022-11-09 PROCEDURE — 92567 TYMPANOMETRY: CPT

## 2022-11-09 RX ORDER — PREDNISONE 10 MG/1
10 TABLET ORAL
Qty: 70 | Refills: 0 | Status: DISCONTINUED | COMMUNITY
Start: 2022-10-06 | End: 2022-11-09

## 2022-11-09 RX ORDER — PREDNISONE 10 MG/1
10 TABLET ORAL
Qty: 42 | Refills: 0 | Status: DISCONTINUED | COMMUNITY
Start: 2022-10-12 | End: 2022-11-09

## 2022-11-09 RX ORDER — PREDNISONE 10 MG/1
10 TABLET ORAL
Qty: 120 | Refills: 0 | Status: DISCONTINUED | COMMUNITY
Start: 2022-08-03 | End: 2022-11-09

## 2022-11-11 LAB
ALBUMIN SERPL ELPH-MCNC: 4.4 G/DL
ALP BLD-CCNC: 49 U/L
ALT SERPL-CCNC: 20 U/L
ANION GAP SERPL CALC-SCNC: 8 MMOL/L
AST SERPL-CCNC: 25 U/L
BASOPHILS # BLD AUTO: 0.02 K/UL
BASOPHILS NFR BLD AUTO: 0.4 %
BILIRUB SERPL-MCNC: 0.6 MG/DL
BUN SERPL-MCNC: 16 MG/DL
CALCIUM SERPL-MCNC: 9.4 MG/DL
CHLORIDE SERPL-SCNC: 104 MMOL/L
CO2 SERPL-SCNC: 28 MMOL/L
CREAT SERPL-MCNC: 1.28 MG/DL
CRP SERPL HS-MCNC: 1 MG/L
EGFR: 69 ML/MIN/1.73M2
EOSINOPHIL # BLD AUTO: 0.11 K/UL
EOSINOPHIL NFR BLD AUTO: 2.3 %
GLUCOSE SERPL-MCNC: 84 MG/DL
HBV E AB SER QL: NONREACTIVE
HBV E AG SER QL: NONREACTIVE
HCT VFR BLD CALC: 42.4 %
HCV AB SER QL: NONREACTIVE
HCV S/CO RATIO: 0.08 S/CO
HGB BLD-MCNC: 14 G/DL
HIV1+2 AB SPEC QL IA.RAPID: NONREACTIVE
IMM GRANULOCYTES NFR BLD AUTO: 0.2 %
LYMPHOCYTES # BLD AUTO: 1.51 K/UL
LYMPHOCYTES NFR BLD AUTO: 31.3 %
M TB IFN-G BLD-IMP: NEGATIVE
MAN DIFF?: NORMAL
MCHC RBC-ENTMCNC: 30 PG
MCHC RBC-ENTMCNC: 33 GM/DL
MCV RBC AUTO: 90.8 FL
MONOCYTES # BLD AUTO: 0.4 K/UL
MONOCYTES NFR BLD AUTO: 8.3 %
NEUTROPHILS # BLD AUTO: 2.78 K/UL
NEUTROPHILS NFR BLD AUTO: 57.5 %
PLATELET # BLD AUTO: 254 K/UL
POTASSIUM SERPL-SCNC: 4.5 MMOL/L
PROT SERPL-MCNC: 6.3 G/DL
QUANTIFERON TB PLUS MITOGEN MINUS NIL: 4.53 IU/ML
QUANTIFERON TB PLUS NIL: 0.02 IU/ML
QUANTIFERON TB PLUS TB1 MINUS NIL: 0 IU/ML
QUANTIFERON TB PLUS TB2 MINUS NIL: 0.01 IU/ML
RBC # BLD: 4.67 M/UL
RBC # FLD: 12.9 %
SODIUM SERPL-SCNC: 141 MMOL/L
WBC # FLD AUTO: 4.83 K/UL

## 2022-11-21 ENCOUNTER — NON-APPOINTMENT (OUTPATIENT)
Age: 47
End: 2022-11-21

## 2022-12-01 NOTE — DATA REVIEWED
[de-identified] : AD: Moderate SNHL.125-.75kHz rising to hearing WNL 1.5-8kHz.\par AS: Hearing essentially WNL.125-.75kHz sloping to an essentially mild to severe SNHL 1-8kHz.

## 2022-12-01 NOTE — HISTORY OF PRESENT ILLNESS
[de-identified] : 48 yo M with EULALIO/MD and vestibular schwannoma - completed prednisone around 10/31. While on Prednisone, hearing was improving but then got cold and hearing got worse in right ear. No dizziness or vertigo attacks. Finished steorids approx 10 days ago  - no sig vertigo

## 2022-12-01 NOTE — DATA REVIEWED
[de-identified] : AD: Moderate SNHL.125-.75kHz rising to hearing WNL 1.5-8kHz.\par AS: Hearing essentially WNL.125-.75kHz sloping to an essentially mild to severe SNHL 1-8kHz.

## 2022-12-01 NOTE — HISTORY OF PRESENT ILLNESS
[de-identified] : 48 yo M with EULALIO/MD and vestibular schwannoma - completed prednisone around 10/31. While on Prednisone, hearing was improving but then got cold and hearing got worse in right ear. No dizziness or vertigo attacks. Finished steorids approx 10 days ago  - no sig vertigo

## 2022-12-19 ENCOUNTER — APPOINTMENT (OUTPATIENT)
Dept: OTOLARYNGOLOGY | Facility: CLINIC | Age: 47
End: 2022-12-19

## 2022-12-19 VITALS
BODY MASS INDEX: 25.77 KG/M2 | SYSTOLIC BLOOD PRESSURE: 124 MMHG | WEIGHT: 180 LBS | HEART RATE: 72 BPM | DIASTOLIC BLOOD PRESSURE: 82 MMHG | HEIGHT: 70 IN

## 2022-12-19 PROCEDURE — 92557 COMPREHENSIVE HEARING TEST: CPT

## 2022-12-19 PROCEDURE — 92567 TYMPANOMETRY: CPT

## 2022-12-19 PROCEDURE — 99213 OFFICE O/P EST LOW 20 MIN: CPT

## 2022-12-19 NOTE — HISTORY OF PRESENT ILLNESS
[de-identified] : 47 yr old returns for follow up of AIED and anakinra clinical trial-  completed 28 days of study injections.  No ISR to report\par States feels no change in hearing since starting the study. No vertigo headahces or other sx.

## 2022-12-19 NOTE — DATA REVIEWED
[de-identified] : Right: Moderately-severe rising to mild SNHL 125-2kHz rising to hearing within normal limits thereafter\par Left:Essentially mild SNHL/borderline WNL to moderately-severe SNHL\par Type A tymps AU

## 2022-12-22 LAB
ALBUMIN SERPL ELPH-MCNC: 4.5 G/DL
ALP BLD-CCNC: 49 U/L
ALT SERPL-CCNC: 14 U/L
ANION GAP SERPL CALC-SCNC: 13 MMOL/L
AST SERPL-CCNC: 20 U/L
BASOPHILS # BLD AUTO: 0.04 K/UL
BASOPHILS NFR BLD AUTO: 0.8 %
BILIRUB SERPL-MCNC: 0.5 MG/DL
BUN SERPL-MCNC: 13 MG/DL
CALCIUM SERPL-MCNC: 10.3 MG/DL
CHLORIDE SERPL-SCNC: 101 MMOL/L
CO2 SERPL-SCNC: 26 MMOL/L
CREAT SERPL-MCNC: 1 MG/DL
CRP SERPL HS-MCNC: 0.42 MG/L
EGFR: 93 ML/MIN/1.73M2
EOSINOPHIL # BLD AUTO: 0.22 K/UL
EOSINOPHIL NFR BLD AUTO: 4.2 %
GLUCOSE SERPL-MCNC: 58 MG/DL
HCT VFR BLD CALC: 45.1 %
HGB BLD-MCNC: 14.7 G/DL
IMM GRANULOCYTES NFR BLD AUTO: 0 %
LYMPHOCYTES # BLD AUTO: 1.51 K/UL
LYMPHOCYTES NFR BLD AUTO: 29.1 %
MAN DIFF?: NORMAL
MCHC RBC-ENTMCNC: 30.8 PG
MCHC RBC-ENTMCNC: 32.6 GM/DL
MCV RBC AUTO: 94.5 FL
MONOCYTES # BLD AUTO: 0.46 K/UL
MONOCYTES NFR BLD AUTO: 8.9 %
NEUTROPHILS # BLD AUTO: 2.96 K/UL
NEUTROPHILS NFR BLD AUTO: 57 %
PLATELET # BLD AUTO: 270 K/UL
POTASSIUM SERPL-SCNC: 4.3 MMOL/L
PROT SERPL-MCNC: 6.3 G/DL
RBC # BLD: 4.77 M/UL
RBC # FLD: 12.7 %
SODIUM SERPL-SCNC: 141 MMOL/L
WBC # FLD AUTO: 5.19 K/UL

## 2023-01-03 ENCOUNTER — APPOINTMENT (OUTPATIENT)
Dept: OTOLARYNGOLOGY | Facility: CLINIC | Age: 48
End: 2023-01-03
Payer: COMMERCIAL

## 2023-01-03 DIAGNOSIS — H93.11 TINNITUS, RIGHT EAR: ICD-10-CM

## 2023-01-03 PROCEDURE — 92567 TYMPANOMETRY: CPT

## 2023-01-03 PROCEDURE — 99213 OFFICE O/P EST LOW 20 MIN: CPT

## 2023-01-03 PROCEDURE — 92557 COMPREHENSIVE HEARING TEST: CPT

## 2023-01-10 LAB
ALBUMIN SERPL ELPH-MCNC: 4.5 G/DL
ALP BLD-CCNC: 49 U/L
ALT SERPL-CCNC: 17 U/L
ANION GAP SERPL CALC-SCNC: 19 MMOL/L
AST SERPL-CCNC: 23 U/L
BASOPHILS # BLD AUTO: 0.03 K/UL
BASOPHILS NFR BLD AUTO: 0.5 %
BILIRUB SERPL-MCNC: 0.6 MG/DL
BUN SERPL-MCNC: 18 MG/DL
CALCIUM SERPL-MCNC: 10.8 MG/DL
CHLORIDE SERPL-SCNC: 99 MMOL/L
CO2 SERPL-SCNC: 22 MMOL/L
CREAT SERPL-MCNC: 1.03 MG/DL
CRP SERPL HS-MCNC: 0.32 MG/L
EGFR: 90 ML/MIN/1.73M2
EOSINOPHIL # BLD AUTO: 0.2 K/UL
EOSINOPHIL NFR BLD AUTO: 3.4 %
GLUCOSE SERPL-MCNC: 67 MG/DL
HCT VFR BLD CALC: 44.2 %
HGB BLD-MCNC: 14.5 G/DL
IMM GRANULOCYTES NFR BLD AUTO: 0.2 %
LYMPHOCYTES # BLD AUTO: 1.85 K/UL
LYMPHOCYTES NFR BLD AUTO: 31 %
MAN DIFF?: NORMAL
MCHC RBC-ENTMCNC: 30.1 PG
MCHC RBC-ENTMCNC: 32.8 GM/DL
MCV RBC AUTO: 91.7 FL
MONOCYTES # BLD AUTO: 0.5 K/UL
MONOCYTES NFR BLD AUTO: 8.4 %
NEUTROPHILS # BLD AUTO: 3.38 K/UL
NEUTROPHILS NFR BLD AUTO: 56.5 %
PLATELET # BLD AUTO: 246 K/UL
POTASSIUM SERPL-SCNC: 4.1 MMOL/L
PROT SERPL-MCNC: 6.7 G/DL
RBC # BLD: 4.82 M/UL
RBC # FLD: 12.5 %
SODIUM SERPL-SCNC: 140 MMOL/L
WBC # FLD AUTO: 5.97 K/UL

## 2023-01-25 ENCOUNTER — APPOINTMENT (OUTPATIENT)
Dept: OTOLARYNGOLOGY | Facility: CLINIC | Age: 48
End: 2023-01-25
Payer: COMMERCIAL

## 2023-01-25 PROCEDURE — 92557 COMPREHENSIVE HEARING TEST: CPT

## 2023-01-25 PROCEDURE — 99213 OFFICE O/P EST LOW 20 MIN: CPT

## 2023-01-25 PROCEDURE — 92567 TYMPANOMETRY: CPT

## 2023-01-30 ENCOUNTER — NON-APPOINTMENT (OUTPATIENT)
Age: 48
End: 2023-01-30

## 2023-01-31 LAB
ALBUMIN SERPL ELPH-MCNC: 4.9 G/DL
ALP BLD-CCNC: 52 U/L
ALT SERPL-CCNC: 16 U/L
ANION GAP SERPL CALC-SCNC: 18 MMOL/L
AST SERPL-CCNC: 18 U/L
BASOPHILS # BLD AUTO: 0.04 K/UL
BASOPHILS NFR BLD AUTO: 0.6 %
BILIRUB SERPL-MCNC: 0.7 MG/DL
BUN SERPL-MCNC: 20 MG/DL
CALCIUM SERPL-MCNC: 10.6 MG/DL
CALCIUM SERPL-MCNC: 10.9 MG/DL
CHLORIDE SERPL-SCNC: 99 MMOL/L
CO2 SERPL-SCNC: 23 MMOL/L
CREAT SERPL-MCNC: 1.15 MG/DL
CRP SERPL HS-MCNC: 0.3 MG/L
EGFR: 78 ML/MIN/1.73M2
EOSINOPHIL # BLD AUTO: 0.34 K/UL
EOSINOPHIL NFR BLD AUTO: 5.3 %
GLUCOSE SERPL-MCNC: 34 MG/DL
HCT VFR BLD CALC: 49 %
HGB BLD-MCNC: 16.3 G/DL
IMM GRANULOCYTES NFR BLD AUTO: 0.2 %
LYMPHOCYTES # BLD AUTO: 1.97 K/UL
LYMPHOCYTES NFR BLD AUTO: 30.9 %
MAN DIFF?: NORMAL
MCHC RBC-ENTMCNC: 30.1 PG
MCHC RBC-ENTMCNC: 33.3 GM/DL
MCV RBC AUTO: 90.6 FL
MONOCYTES # BLD AUTO: 0.51 K/UL
MONOCYTES NFR BLD AUTO: 8 %
NEUTROPHILS # BLD AUTO: 3.51 K/UL
NEUTROPHILS NFR BLD AUTO: 55 %
PARATHYROID HORMONE INTACT: 9 PG/ML
PLATELET # BLD AUTO: 296 K/UL
POTASSIUM SERPL-SCNC: 4.3 MMOL/L
PROT SERPL-MCNC: 7.4 G/DL
RBC # BLD: 5.41 M/UL
RBC # FLD: 12.8 %
SODIUM SERPL-SCNC: 140 MMOL/L
WBC # FLD AUTO: 6.38 K/UL

## 2023-02-15 ENCOUNTER — APPOINTMENT (OUTPATIENT)
Dept: OTOLARYNGOLOGY | Facility: CLINIC | Age: 48
End: 2023-02-15
Payer: COMMERCIAL

## 2023-02-15 PROCEDURE — 99213 OFFICE O/P EST LOW 20 MIN: CPT

## 2023-02-15 PROCEDURE — 92557 COMPREHENSIVE HEARING TEST: CPT

## 2023-02-15 PROCEDURE — 92567 TYMPANOMETRY: CPT

## 2023-02-24 PROBLEM — H93.11 TINNITUS OF RIGHT EAR: Status: ACTIVE | Noted: 2019-12-04

## 2023-02-24 NOTE — HISTORY OF PRESENT ILLNESS
[de-identified] : 47 yr old with AIED returns for follow up for anakinra clinical trial-  completed 42 days of study medication on 1/1/23-  missed yesterday dose due to holiday and patient's vacation-  to start on cross-over treatment today.  No ISRs to report.  Feels hearing has worsened over past few days - noted increased tinnitus.  No c/o vertigo

## 2023-02-24 NOTE — HISTORY OF PRESENT ILLNESS
[de-identified] : 47 yr old returns for follow up of anakinra clinical trial.  Has completed 84 days of study injections on Monday.  No current ISRs. Feels hearing in right ear improved over past few days since tinnitus seems lower.  Last Thursday felt hearing had decreased with some dizziness ( 8 days ago)- lasted only 1 day.  Dx Covid + on 2/1/23 - had overnight fever and some mild nasal congestion - symptoms fully resolved- did not stop study injections during that time. Finished day 84 2 days ago .

## 2023-02-24 NOTE — DATA REVIEWED
[de-identified] : Essentially mild to severe SNHL 125-8kHz (-1kHz) AS. Essentially moderately severe SNHL 125-8kHz AD. Type A (normal) tympanograms AU. Good speech recognition scores AU.

## 2023-02-24 NOTE — HISTORY OF PRESENT ILLNESS
[de-identified] : 47 yr old returns for follow up of anakinra study - has taken 22 study injections since cross-over treatment started.  Started having injection reactions (erythema, itchiness, swelling) about 1 1/2 weeks ago which continue at this time.  No noticeable change in hearing noted by patient - states on Monday he felt a little "off"- had some transient dizziness and nausea while at work.  Had taken medrol dose pack for neck pain/possible herniated disc 2 weeks ago - being followed by orthopedist.  Noted slightly elevated calcium from last visit (10.8) - repeated today along with a PTH level to see if it is significant or not.

## 2023-02-24 NOTE — DATA REVIEWED
[de-identified] : RE: Moderate rising to a mild ess SNHL through 500 Hz, rising to hearing WNL.\par LE: Mild sloping to a moderately severe/moderate SNHL.\par Type A Tymp, Au.

## 2023-02-24 NOTE — DATA REVIEWED
[de-identified] : Right - severe rising to a mild sensorineural hearing loss through 2000Hz to WNL thereafter.\par Left - mild to severe sensorineural hearing loss with normal hearing noted 500-750Hz\par Impedance testing reveals normal Type A tympanograms bilaterally\par

## 2023-03-01 ENCOUNTER — APPOINTMENT (OUTPATIENT)
Dept: OTOLARYNGOLOGY | Facility: CLINIC | Age: 48
End: 2023-03-01
Payer: COMMERCIAL

## 2023-03-01 LAB
ALBUMIN SERPL ELPH-MCNC: 4.8 G/DL
ALP BLD-CCNC: 54 U/L
ALT SERPL-CCNC: 15 U/L
ANION GAP SERPL CALC-SCNC: 14 MMOL/L
AST SERPL-CCNC: 19 U/L
BASOPHILS # BLD AUTO: 0.05 K/UL
BASOPHILS NFR BLD AUTO: 0.7 %
BILIRUB SERPL-MCNC: 0.4 MG/DL
BUN SERPL-MCNC: 18 MG/DL
CALCIUM SERPL-MCNC: 10.2 MG/DL
CHLORIDE SERPL-SCNC: 99 MMOL/L
CO2 SERPL-SCNC: 25 MMOL/L
CREAT SERPL-MCNC: 1.25 MG/DL
CRP SERPL HS-MCNC: 0.17 MG/L
EGFR: 71 ML/MIN/1.73M2
EOSINOPHIL # BLD AUTO: 0.33 K/UL
EOSINOPHIL NFR BLD AUTO: 4.6 %
GLUCOSE SERPL-MCNC: 62 MG/DL
HCT VFR BLD CALC: 43.9 %
HGB BLD-MCNC: 14.9 G/DL
IMM GRANULOCYTES NFR BLD AUTO: 0.3 %
LYMPHOCYTES # BLD AUTO: 1.76 K/UL
LYMPHOCYTES NFR BLD AUTO: 24.3 %
MAN DIFF?: NORMAL
MCHC RBC-ENTMCNC: 30.2 PG
MCHC RBC-ENTMCNC: 33.9 GM/DL
MCV RBC AUTO: 89 FL
MONOCYTES # BLD AUTO: 0.44 K/UL
MONOCYTES NFR BLD AUTO: 6.1 %
NEUTROPHILS # BLD AUTO: 4.63 K/UL
NEUTROPHILS NFR BLD AUTO: 64 %
PLATELET # BLD AUTO: 305 K/UL
POTASSIUM SERPL-SCNC: 4.5 MMOL/L
PROT SERPL-MCNC: 6.7 G/DL
RBC # BLD: 4.93 M/UL
RBC # FLD: 12.5 %
SODIUM SERPL-SCNC: 138 MMOL/L
WBC # FLD AUTO: 7.23 K/UL

## 2023-03-01 PROCEDURE — 99213 OFFICE O/P EST LOW 20 MIN: CPT

## 2023-03-01 PROCEDURE — 92557 COMPREHENSIVE HEARING TEST: CPT

## 2023-03-01 PROCEDURE — 92567 TYMPANOMETRY: CPT

## 2023-03-01 NOTE — HISTORY OF PRESENT ILLNESS
[de-identified] : Patient returns after reporting an increase in tinnitus in his right ear starting 2/18/23 with decreased hearing noted.  Did have some minimal dizziness that day as well. Was traveling to [place] and [place] last week so no updated audiogram could be obtained. Last study injection was on 2/13/23-

## 2023-03-01 NOTE — HISTORY OF PRESENT ILLNESS
[de-identified] : Patient returns after reporting an increase in tinnitus in his right ear starting 2/18/23 with decreased hearing noted.  Did have some minimal dizziness that day as well. Was traveling to [place] and [place] last week so no updated audiogram could be obtained. Last study injection was on 2/13/23-

## 2023-03-03 LAB
ALBUMIN SERPL ELPH-MCNC: 4.6 G/DL
ALP BLD-CCNC: 45 U/L
ALT SERPL-CCNC: 17 U/L
ANION GAP SERPL CALC-SCNC: 18 MMOL/L
AST SERPL-CCNC: 22 U/L
BASOPHILS # BLD AUTO: 0.04 K/UL
BASOPHILS NFR BLD AUTO: 0.8 %
BILIRUB SERPL-MCNC: 0.9 MG/DL
BUN SERPL-MCNC: 20 MG/DL
CALCIUM SERPL-MCNC: 10.4 MG/DL
CHLORIDE SERPL-SCNC: 100 MMOL/L
CO2 SERPL-SCNC: 23 MMOL/L
CREAT SERPL-MCNC: 1.18 MG/DL
CRP SERPL HS-MCNC: 0.31 MG/L
EGFR: 76 ML/MIN/1.73M2
EOSINOPHIL # BLD AUTO: 0.17 K/UL
EOSINOPHIL NFR BLD AUTO: 3.3 %
GLUCOSE SERPL-MCNC: 53 MG/DL
HCT VFR BLD CALC: 46.1 %
HGB BLD-MCNC: 15.1 G/DL
IMM GRANULOCYTES NFR BLD AUTO: 0.2 %
LYMPHOCYTES # BLD AUTO: 1.53 K/UL
LYMPHOCYTES NFR BLD AUTO: 30.1 %
MAN DIFF?: NORMAL
MCHC RBC-ENTMCNC: 29.8 PG
MCHC RBC-ENTMCNC: 32.8 GM/DL
MCV RBC AUTO: 91.1 FL
MONOCYTES # BLD AUTO: 0.47 K/UL
MONOCYTES NFR BLD AUTO: 9.3 %
NEUTROPHILS # BLD AUTO: 2.86 K/UL
NEUTROPHILS NFR BLD AUTO: 56.3 %
PLATELET # BLD AUTO: 256 K/UL
POTASSIUM SERPL-SCNC: 4.6 MMOL/L
PROT SERPL-MCNC: 7.1 G/DL
RBC # BLD: 5.06 M/UL
RBC # FLD: 13 %
SODIUM SERPL-SCNC: 140 MMOL/L
WBC # FLD AUTO: 5.08 K/UL

## 2023-03-15 ENCOUNTER — APPOINTMENT (OUTPATIENT)
Dept: OTOLARYNGOLOGY | Facility: CLINIC | Age: 48
End: 2023-03-15

## 2023-03-29 ENCOUNTER — APPOINTMENT (OUTPATIENT)
Dept: OTOLARYNGOLOGY | Facility: CLINIC | Age: 48
End: 2023-03-29
Payer: COMMERCIAL

## 2023-03-29 PROCEDURE — 99214 OFFICE O/P EST MOD 30 MIN: CPT

## 2023-03-29 PROCEDURE — 92567 TYMPANOMETRY: CPT

## 2023-03-29 PROCEDURE — 92557 COMPREHENSIVE HEARING TEST: CPT

## 2023-04-04 LAB
ALBUMIN SERPL ELPH-MCNC: 4.9 G/DL
ALP BLD-CCNC: 43 U/L
ALT SERPL-CCNC: 13 U/L
ANION GAP SERPL CALC-SCNC: 18 MMOL/L
AST SERPL-CCNC: 18 U/L
BILIRUB SERPL-MCNC: 1.2 MG/DL
BUN SERPL-MCNC: 18 MG/DL
CALCIUM SERPL-MCNC: 10.4 MG/DL
CHLORIDE SERPL-SCNC: 100 MMOL/L
CO2 SERPL-SCNC: 23 MMOL/L
CREAT SERPL-MCNC: 1.13 MG/DL
CRP SERPL HS-MCNC: 0.16 MG/L
EGFR: 80 ML/MIN/1.73M2
GLUCOSE SERPL-MCNC: 48 MG/DL
POTASSIUM SERPL-SCNC: 4 MMOL/L
PROT SERPL-MCNC: 6.9 G/DL
SODIUM SERPL-SCNC: 140 MMOL/L

## 2023-04-04 NOTE — HISTORY OF PRESENT ILLNESS
[de-identified] : Patient returns for follow up of anakinra clinical study -  completed 28 day supply of open-label anakinra yesterday - felt hearing in right ear improved significantly last week but since midday Saturday, increased right tinnitus returned and felt hearing had decreased.  + mild spinning on Monday while at work- on and off for 1 hour - no nausea and vomiting.  No ISRs to report.  Lost 10 lbs past month due to diet changes.

## 2023-04-06 RX ORDER — MECLIZINE HYDROCHLORIDE 25 MG/1
25 TABLET ORAL
Qty: 90 | Refills: 0 | Status: ACTIVE | COMMUNITY
Start: 2023-04-06 | End: 1900-01-01

## 2023-04-11 LAB
BASOPHILS # BLD AUTO: 0.04 K/UL
BASOPHILS NFR BLD AUTO: 0.9 %
EOSINOPHIL # BLD AUTO: 0.28 K/UL
EOSINOPHIL NFR BLD AUTO: 6.3 %
HCT VFR BLD CALC: 45 %
HGB BLD-MCNC: 14.6 G/DL
IMM GRANULOCYTES NFR BLD AUTO: 0.2 %
LYMPHOCYTES # BLD AUTO: 1.87 K/UL
LYMPHOCYTES NFR BLD AUTO: 41.7 %
MAN DIFF?: NORMAL
MCHC RBC-ENTMCNC: 29.8 PG
MCHC RBC-ENTMCNC: 32.4 GM/DL
MCV RBC AUTO: 91.8 FL
MONOCYTES # BLD AUTO: 0.44 K/UL
MONOCYTES NFR BLD AUTO: 9.8 %
NEUTROPHILS # BLD AUTO: 1.84 K/UL
NEUTROPHILS NFR BLD AUTO: 41.1 %
PLATELET # BLD AUTO: 237 K/UL
RBC # BLD: 4.9 M/UL
RBC # FLD: 13.4 %
WBC # FLD AUTO: 4.48 K/UL

## 2023-04-26 ENCOUNTER — APPOINTMENT (OUTPATIENT)
Dept: OTOLARYNGOLOGY | Facility: CLINIC | Age: 48
End: 2023-04-26
Payer: COMMERCIAL

## 2023-04-26 PROCEDURE — 92567 TYMPANOMETRY: CPT

## 2023-04-26 PROCEDURE — 99213 OFFICE O/P EST LOW 20 MIN: CPT

## 2023-04-26 PROCEDURE — 92557 COMPREHENSIVE HEARING TEST: CPT

## 2023-04-27 NOTE — HISTORY OF PRESENT ILLNESS
[de-identified] : Patient with AIED returns for follow up of anakinra clinical trial - completed another 28 days of open-label anakinra-  feels hearing has been better over the past few days-  tinnitus improved.  Had 1 severe episode of vertigo approx 3 weeks ago- lasted a few hours - none since.  No c/o headaches -  on flonase currently due to allergies.

## 2023-04-27 NOTE — DATA REVIEWED
[de-identified] : Right - rising sensorineural hearing loss through 500Hz to WNL thereafter\par Left - mild to moderate sensorineural hearing loss after 500Hz\par Impedance testing reveals normal Type A tympanograms bilaterally [de-identified] : \par

## 2023-05-01 ENCOUNTER — NON-APPOINTMENT (OUTPATIENT)
Age: 48
End: 2023-05-01

## 2023-05-01 RX ORDER — ANAKINRA 100 MG/.67ML
100 INJECTION, SOLUTION SUBCUTANEOUS
Qty: 28 | Refills: 6 | Status: ACTIVE | COMMUNITY
Start: 2023-05-01 | End: 1900-01-01

## 2023-05-03 ENCOUNTER — APPOINTMENT (OUTPATIENT)
Dept: OTOLARYNGOLOGY | Facility: CLINIC | Age: 48
End: 2023-05-03
Payer: COMMERCIAL

## 2023-05-03 DIAGNOSIS — D33.3 BENIGN NEOPLASM OF CRANIAL NERVES: ICD-10-CM

## 2023-05-03 DIAGNOSIS — H90.5 UNSPECIFIED SENSORINEURAL HEARING LOSS: ICD-10-CM

## 2023-05-03 PROCEDURE — 92567 TYMPANOMETRY: CPT

## 2023-05-03 PROCEDURE — 92557 COMPREHENSIVE HEARING TEST: CPT

## 2023-05-03 PROCEDURE — 99214 OFFICE O/P EST MOD 30 MIN: CPT

## 2023-05-08 PROBLEM — H90.5 HEARING LOSS, NEURAL, UNILATERAL: Status: ACTIVE | Noted: 2019-05-15

## 2023-05-08 NOTE — DATA REVIEWED
[de-identified] : LE: Mild HL at 250 Hz, rising to hearing WNL through 750 Hz, sloping to a mild to ess moderately severe riisng to moderate SNHL.\par RE: oderately severe rising to a mild ess SNHL through 2 KHz, rising to hearing WNL.\par Type A Typ, Au.

## 2023-05-08 NOTE — HISTORY OF PRESENT ILLNESS
[de-identified] : Patient returns for follow up for anakinra clinical trial -  completed 2nd course of open-label anakinra last week, but returns today after emailing us on Monday that the tinnitus in his right ear had increased and felt his hearing had decreased again.  No c/o dizziness or vertigo. Has received insurance approval for off-label use -

## 2023-05-10 LAB
ALBUMIN SERPL ELPH-MCNC: 4.5 G/DL
ALBUMIN SERPL ELPH-MCNC: 4.8 G/DL
ALP BLD-CCNC: 48 U/L
ALP BLD-CCNC: 57 U/L
ALT SERPL-CCNC: 13 U/L
ALT SERPL-CCNC: 8 U/L
ANION GAP SERPL CALC-SCNC: 15 MMOL/L
ANION GAP SERPL CALC-SCNC: 15 MMOL/L
AST SERPL-CCNC: 17 U/L
AST SERPL-CCNC: 19 U/L
BASOPHILS # BLD AUTO: 0.04 K/UL
BASOPHILS # BLD AUTO: 0.05 K/UL
BASOPHILS NFR BLD AUTO: 0.9 %
BASOPHILS NFR BLD AUTO: 1 %
BILIRUB SERPL-MCNC: 0.8 MG/DL
BILIRUB SERPL-MCNC: 0.9 MG/DL
BUN SERPL-MCNC: 18 MG/DL
BUN SERPL-MCNC: 18 MG/DL
CALCIUM SERPL-MCNC: 10.3 MG/DL
CALCIUM SERPL-MCNC: 10.4 MG/DL
CHLORIDE SERPL-SCNC: 100 MMOL/L
CHLORIDE SERPL-SCNC: 101 MMOL/L
CO2 SERPL-SCNC: 24 MMOL/L
CO2 SERPL-SCNC: 24 MMOL/L
CREAT SERPL-MCNC: 1.11 MG/DL
CREAT SERPL-MCNC: 1.11 MG/DL
CRP SERPL HS-MCNC: 0.15 MG/L
CRP SERPL HS-MCNC: 0.44 MG/L
EGFR: 82 ML/MIN/1.73M2
EGFR: 82 ML/MIN/1.73M2
EOSINOPHIL # BLD AUTO: 0.18 K/UL
EOSINOPHIL # BLD AUTO: 0.26 K/UL
EOSINOPHIL NFR BLD AUTO: 4.1 %
EOSINOPHIL NFR BLD AUTO: 5.2 %
GLUCOSE SERPL-MCNC: 52 MG/DL
GLUCOSE SERPL-MCNC: 52 MG/DL
HCT VFR BLD CALC: 45.6 %
HCT VFR BLD CALC: 47.3 %
HGB BLD-MCNC: 14.8 G/DL
HGB BLD-MCNC: 15.4 G/DL
IMM GRANULOCYTES NFR BLD AUTO: 0.2 %
IMM GRANULOCYTES NFR BLD AUTO: 0.2 %
LYMPHOCYTES # BLD AUTO: 1.46 K/UL
LYMPHOCYTES # BLD AUTO: 1.59 K/UL
LYMPHOCYTES NFR BLD AUTO: 31.7 %
LYMPHOCYTES NFR BLD AUTO: 33.5 %
MAN DIFF?: NORMAL
MAN DIFF?: NORMAL
MCHC RBC-ENTMCNC: 29.7 PG
MCHC RBC-ENTMCNC: 30 PG
MCHC RBC-ENTMCNC: 32.5 GM/DL
MCHC RBC-ENTMCNC: 32.6 GM/DL
MCV RBC AUTO: 91.1 FL
MCV RBC AUTO: 92.3 FL
MONOCYTES # BLD AUTO: 0.39 K/UL
MONOCYTES # BLD AUTO: 0.45 K/UL
MONOCYTES NFR BLD AUTO: 8.9 %
MONOCYTES NFR BLD AUTO: 9 %
NEUTROPHILS # BLD AUTO: 2.28 K/UL
NEUTROPHILS # BLD AUTO: 2.66 K/UL
NEUTROPHILS NFR BLD AUTO: 52.4 %
NEUTROPHILS NFR BLD AUTO: 52.9 %
PLATELET # BLD AUTO: 255 K/UL
PLATELET # BLD AUTO: 258 K/UL
POTASSIUM SERPL-SCNC: 4.4 MMOL/L
POTASSIUM SERPL-SCNC: 4.7 MMOL/L
PROT SERPL-MCNC: 6.5 G/DL
PROT SERPL-MCNC: 7.2 G/DL
RBC # BLD: 4.94 M/UL
RBC # BLD: 5.19 M/UL
RBC # FLD: 12.9 %
RBC # FLD: 13.1 %
SODIUM SERPL-SCNC: 139 MMOL/L
SODIUM SERPL-SCNC: 141 MMOL/L
WBC # FLD AUTO: 4.36 K/UL
WBC # FLD AUTO: 5.02 K/UL

## 2023-05-31 ENCOUNTER — APPOINTMENT (OUTPATIENT)
Dept: OTOLARYNGOLOGY | Facility: CLINIC | Age: 48
End: 2023-05-31

## 2023-10-03 ENCOUNTER — NON-APPOINTMENT (OUTPATIENT)
Age: 48
End: 2023-10-03

## 2023-10-04 ENCOUNTER — APPOINTMENT (OUTPATIENT)
Dept: OTOLARYNGOLOGY | Facility: CLINIC | Age: 48
End: 2023-10-04
Payer: COMMERCIAL

## 2023-10-04 PROCEDURE — 92557 COMPREHENSIVE HEARING TEST: CPT

## 2023-10-04 PROCEDURE — 92567 TYMPANOMETRY: CPT

## 2023-10-04 PROCEDURE — 99214 OFFICE O/P EST MOD 30 MIN: CPT

## 2024-01-03 ENCOUNTER — TRANSCRIPTION ENCOUNTER (OUTPATIENT)
Age: 49
End: 2024-01-03

## 2024-01-10 ENCOUNTER — APPOINTMENT (OUTPATIENT)
Dept: OTOLARYNGOLOGY | Facility: CLINIC | Age: 49
End: 2024-01-10
Payer: COMMERCIAL

## 2024-01-10 PROCEDURE — 99214 OFFICE O/P EST MOD 30 MIN: CPT

## 2024-01-10 PROCEDURE — 92567 TYMPANOMETRY: CPT

## 2024-01-10 PROCEDURE — 92557 COMPREHENSIVE HEARING TEST: CPT

## 2024-01-11 NOTE — DATA REVIEWED
[de-identified] : Right ear: Mild SNHL rising to normal hearing from 250Hz-8kHz. Left ear: Essentially mild to severe SNHL from 250Hz-4kHz rising to a moderate HL thereafter.  Type A tymps Au

## 2024-01-11 NOTE — HISTORY OF PRESENT ILLNESS
[de-identified] : 47 yo M with AIED on anakinra presents for follow up. Had labs with PMD in fall - all labs normal - recent drop in hearing while in [place] - has similar drop when away previously - suggests allergic trigger - has new PMD - Dr Tito [place]

## 2024-01-16 ENCOUNTER — TRANSCRIPTION ENCOUNTER (OUTPATIENT)
Age: 49
End: 2024-01-16

## 2024-01-16 RX ORDER — TRIAMTERENE AND HYDROCHLOROTHIAZIDE 25; 37.5 MG/1; MG/1
37.5-25 TABLET ORAL DAILY
Qty: 90 | Refills: 3 | Status: ACTIVE | COMMUNITY
Start: 2019-11-13 | End: 1900-01-01

## 2024-01-30 ENCOUNTER — NON-APPOINTMENT (OUTPATIENT)
Age: 49
End: 2024-01-30

## 2024-02-16 ENCOUNTER — APPOINTMENT (OUTPATIENT)
Dept: OTOLARYNGOLOGY | Facility: CLINIC | Age: 49
End: 2024-02-16
Payer: COMMERCIAL

## 2024-02-16 PROCEDURE — 92625 TINNITUS ASSESSMENT: CPT

## 2024-02-16 PROCEDURE — 92557 COMPREHENSIVE HEARING TEST: CPT

## 2024-02-16 PROCEDURE — 92567 TYMPANOMETRY: CPT

## 2024-03-06 ENCOUNTER — APPOINTMENT (OUTPATIENT)
Dept: OTOLARYNGOLOGY | Facility: CLINIC | Age: 49
End: 2024-03-06
Payer: COMMERCIAL

## 2024-03-06 PROCEDURE — 99214 OFFICE O/P EST MOD 30 MIN: CPT

## 2024-03-06 PROCEDURE — 92567 TYMPANOMETRY: CPT

## 2024-03-06 PROCEDURE — 92557 COMPREHENSIVE HEARING TEST: CPT

## 2024-03-06 PROCEDURE — G2211 COMPLEX E/M VISIT ADD ON: CPT

## 2024-03-07 ENCOUNTER — NON-APPOINTMENT (OUTPATIENT)
Age: 49
End: 2024-03-07

## 2024-03-19 NOTE — DATA REVIEWED
[de-identified] : Essentially Mild to moderately-severe SNHL in the left ear.  Moderately-severe rising mild SNHL in the right ear. Type A tymps Au

## 2024-03-20 ENCOUNTER — APPOINTMENT (OUTPATIENT)
Dept: OTOLARYNGOLOGY | Facility: CLINIC | Age: 49
End: 2024-03-20
Payer: COMMERCIAL

## 2024-03-20 PROCEDURE — 92567 TYMPANOMETRY: CPT

## 2024-03-20 PROCEDURE — 92557 COMPREHENSIVE HEARING TEST: CPT

## 2024-03-20 PROCEDURE — 99214 OFFICE O/P EST MOD 30 MIN: CPT

## 2024-03-20 NOTE — HISTORY OF PRESENT ILLNESS
[de-identified] : Feels no improvement on kineret, betahistine, diureitc - took steorids - to start taper

## 2024-03-20 NOTE — DATA REVIEWED
[de-identified] : Right - mild to moderate rising sensorineural hearing loss   Left - mild to severe sensorineural hearing loss  Impedance testing reveals normal Type A tympanograms bilaterally

## 2024-04-10 ENCOUNTER — APPOINTMENT (OUTPATIENT)
Dept: PHARMACY | Facility: CLINIC | Age: 49
End: 2024-04-10
Payer: SELF-PAY

## 2024-04-10 PROCEDURE — V5010 ASSESSMENT FOR HEARING AID: CPT | Mod: NC

## 2024-04-10 NOTE — HISTORY OF PRESENT ILLNESS
[FreeTextEntry1] : 48 year old male referred with medical clearance for amplification from ENT Dr. Bush.  Recent AA revealed a bilateral asymmetric sensorineural hearing loss: moderately severe at 250Hz, upwardly sloping through mild to hearing WNL 3-6K in the right ear, borderine to mild 250 to 2k, sloping to moderate/severe thereafter in the left ear.  Pt dx with Meniere's disease, treated with med by Dr. Bush.  Pt stated that roaring bothersome noise Ad is main complaint, also has some tinnitus As.  Mr. Knobloch notes difficulties hearing in group settings. and uses cell phone on speaker.

## 2024-04-10 NOTE — PROCEDURE
[de-identified] : Counselled pt re:styles, technologies, fitting procedures and 45 day trial basis.  Pt and his wife were interested in invisible custom, but really wanted Bluetooth compatibility and rechargeable.  Performed demo of WideEyesBot Moment 440 hearing aids with and without Stephen/relax settings.  Pt noted aids sounded loud but comfortable. Also noted tinnitus was not as loud a usual, but felt he had gotten used to the noise. Ultimately decided on Widex Moment 440-R in black, chose extra  and comdex as complementary accessories. Purchase agreement signed.

## 2024-04-24 ENCOUNTER — APPOINTMENT (OUTPATIENT)
Dept: OTOLARYNGOLOGY | Facility: CLINIC | Age: 49
End: 2024-04-24
Payer: COMMERCIAL

## 2024-04-24 DIAGNOSIS — H93.A3 PULSATILE TINNITUS, BILATERAL: ICD-10-CM

## 2024-04-24 PROCEDURE — 92567 TYMPANOMETRY: CPT

## 2024-04-24 PROCEDURE — 92557 COMPREHENSIVE HEARING TEST: CPT

## 2024-04-24 PROCEDURE — 99214 OFFICE O/P EST MOD 30 MIN: CPT

## 2024-04-24 RX ORDER — PREDNISONE 10 MG/1
10 TABLET ORAL
Qty: 49 | Refills: 0 | Status: COMPLETED | COMMUNITY
Start: 2024-02-16 | End: 2024-04-24

## 2024-04-24 RX ORDER — PREDNISONE 10 MG/1
10 TABLET ORAL
Qty: 70 | Refills: 0 | Status: COMPLETED | COMMUNITY
Start: 2021-11-17 | End: 2024-04-24

## 2024-04-24 RX ORDER — PREDNISONE 10 MG/1
10 TABLET ORAL
Qty: 120 | Refills: 0 | Status: COMPLETED | COMMUNITY
Start: 2024-03-06 | End: 2024-04-24

## 2024-04-24 RX ORDER — ALPRAZOLAM 0.25 MG/1
0.25 TABLET ORAL
Qty: 30 | Refills: 0 | Status: ACTIVE | COMMUNITY
Start: 2024-04-24 | End: 1900-01-01

## 2024-04-24 NOTE — DATA REVIEWED
[de-identified] : AD: Moderately-severe SNHL .25-1kHz rising to essentially WNL 3-8kHz w/ good WRS and type A tymp. AS: Hearing WNL .25-.75kHz sloping to an essentially moderate to severe SNHL 1-8kHz w/ excellent WRS and type A tymp.

## 2024-04-24 NOTE — HISTORY OF PRESENT ILLNESS
[de-identified] : 49 yo M with asymmetric SNHL on Kineret, Dyazide and Betahistine presents for 6 week follow up. Started Allegra after last visit. Tinnitus is worse in right ear and no change in left ear. No vertigo episodes, but feels off - dizzy and not spinning- today.

## 2024-05-14 RX ORDER — ANAKINRA 100 MG/.67ML
100 INJECTION, SOLUTION SUBCUTANEOUS
Qty: 28 | Refills: 6 | Status: ACTIVE | COMMUNITY
Start: 2023-10-04 | End: 1900-01-01

## 2024-05-22 ENCOUNTER — APPOINTMENT (OUTPATIENT)
Dept: OTOLARYNGOLOGY | Facility: CLINIC | Age: 49
End: 2024-05-22
Payer: COMMERCIAL

## 2024-05-22 ENCOUNTER — APPOINTMENT (OUTPATIENT)
Dept: PHARMACY | Facility: CLINIC | Age: 49
End: 2024-05-22
Payer: COMMERCIAL

## 2024-05-22 DIAGNOSIS — H83.8X9 OTHER SPECIFIED DISEASES OF INNER EAR, UNSPECIFIED EAR: ICD-10-CM

## 2024-05-22 DIAGNOSIS — H91.21 SUDDEN IDIOPATHIC HEARING LOSS, RIGHT EAR: ICD-10-CM

## 2024-05-22 DIAGNOSIS — H90.3 SENSORINEURAL HEARING LOSS, BILATERAL: ICD-10-CM

## 2024-05-22 PROCEDURE — 92567 TYMPANOMETRY: CPT

## 2024-05-22 PROCEDURE — V5261A: CUSTOM

## 2024-05-22 PROCEDURE — 92557 COMPREHENSIVE HEARING TEST: CPT

## 2024-05-22 PROCEDURE — 99214 OFFICE O/P EST MOD 30 MIN: CPT

## 2024-05-22 RX ORDER — PREDNISONE 10 MG/1
10 TABLET ORAL
Qty: 120 | Refills: 0 | Status: ACTIVE | COMMUNITY
Start: 2024-05-22 | End: 1900-01-01

## 2024-05-22 NOTE — PROCEDURE
[de-identified] : New Queralt Moment 440 MR-R Hearing aids programmed, performed feedback test and sensogram.  As per pt comfort level,  aids sent to acclim No. 3 of 4 and lowered gain at 500Hz by 3DB Ad only.  Benefit verified to NAL targets prior to sensogram, better target match As.  Used Sleeve vented domes  Au.   Provided three Stephen Plus programs, 1 with Stephen and two with Relax.  Pt preferred Relax programs but was willing to try all three.      Counselled pt re: care, use and insertion.  Pt was able to insert easily.  Paired to phone and demonstrated phone call.  Downloaded Rafael and reviewed use.  Also dispensed Complementary Comdex and reviewed use with pts computer adn possibly with additonal ALDS to assist in meetings. Purchase agreement reviewed and signed.

## 2024-05-22 NOTE — HISTORY OF PRESENT ILLNESS
[FreeTextEntry1] : 49 year old male referred with medical clearance for amplification from ENT Dr. Bush.  Recent AA revealed a bilateral asymmetric sensorineural hearing loss: moderately severe at 250Hz, upwardly sloping through mild to hearing WNL 3-6K in the right ear, borderine to mild 250 to 2k, sloping to moderate/severe thereafter in the left ear.  Pt dx with Meniere's disease, treated with med by Dr. Bush.  Pt stated that roaring bothersome noise Ad is main complaint, also has some tinnitus As.  Mr. Knobloch notes difficulties hearing in group settings. and uses cell phone on speaker. [FreeTextEntry8] : Mr. Knobloch was seen last month for a HA consultation and Widex Moment 440 MR-R hearing aids recommended and ordered.

## 2024-05-31 PROBLEM — H83.8X9 AUTOIMMUNE INNER EAR DISEASE: Status: ACTIVE | Noted: 2022-11-09

## 2024-05-31 PROBLEM — H91.21 SUDDEN IDIOPATHIC HEARING LOSS OF RIGHT EAR WITH RESTRICTED HEARING OF LEFT EAR: Status: ACTIVE | Noted: 2019-12-15

## 2024-05-31 PROBLEM — H90.3 ASYMMETRICAL SENSORINEURAL HEARING LOSS: Status: ACTIVE | Noted: 2021-07-19

## 2024-05-31 NOTE — DATA REVIEWED
[de-identified] : Right- severe rising to a mild sensorineural hearing loss through 3000Hz to WNL thereafter Left - mild to moderate sensorineural hearing loss  Impedance testing reveals normal Type A tympanograms bilaterally

## 2024-06-13 ENCOUNTER — APPOINTMENT (OUTPATIENT)
Dept: PHARMACY | Facility: CLINIC | Age: 49
End: 2024-06-13

## 2024-07-02 ENCOUNTER — APPOINTMENT (OUTPATIENT)
Dept: PHARMACY | Facility: CLINIC | Age: 49
End: 2024-07-02
Payer: SELF-PAY

## 2024-07-02 PROCEDURE — V5299A: CUSTOM

## 2024-08-07 ENCOUNTER — APPOINTMENT (OUTPATIENT)
Dept: PHARMACY | Facility: CLINIC | Age: 49
End: 2024-08-07

## 2024-08-07 PROCEDURE — V5299A: CUSTOM

## 2024-08-08 ENCOUNTER — APPOINTMENT (OUTPATIENT)
Dept: OTOLARYNGOLOGY | Facility: CLINIC | Age: 49
End: 2024-08-08

## 2024-08-08 PROCEDURE — 92557 COMPREHENSIVE HEARING TEST: CPT

## 2024-08-08 PROCEDURE — 92567 TYMPANOMETRY: CPT

## 2024-08-08 PROCEDURE — 99214 OFFICE O/P EST MOD 30 MIN: CPT

## 2024-08-08 NOTE — DATA REVIEWED
[de-identified] : Right - mild to severe sensorineural hearing loss  Left - mild to moderate sensorineural hearing loss  Impedance testing reveals normal Type A tympanograms bilaterally

## 2024-08-08 NOTE — HISTORY OF PRESENT ILLNESS
[de-identified] : 48 yo M with AIED presents for follow up after steroids. No noticeable change in hearing. Now has nausea and positional vertigo when lies flat and turns head to left side. Continues to take Kineret, Dyazide and Betahistine and - [facility] - previously did not tolerte betahistine

## 2024-08-09 ENCOUNTER — NON-APPOINTMENT (OUTPATIENT)
Age: 49
End: 2024-08-09

## 2024-09-25 ENCOUNTER — APPOINTMENT (OUTPATIENT)
Dept: PHARMACY | Facility: CLINIC | Age: 49
End: 2024-09-25
Payer: SELF-PAY

## 2024-09-25 PROCEDURE — V5299A: CUSTOM

## 2024-11-26 ENCOUNTER — APPOINTMENT (OUTPATIENT)
Dept: OTOLARYNGOLOGY | Facility: CLINIC | Age: 49
End: 2024-11-26

## 2024-11-26 PROCEDURE — XXXXX: CPT | Mod: 1L

## 2024-12-04 ENCOUNTER — NON-APPOINTMENT (OUTPATIENT)
Age: 49
End: 2024-12-04

## 2024-12-06 ENCOUNTER — NON-APPOINTMENT (OUTPATIENT)
Age: 49
End: 2024-12-06

## 2025-04-02 ENCOUNTER — APPOINTMENT (OUTPATIENT)
Dept: OTOLARYNGOLOGY | Facility: CLINIC | Age: 50
End: 2025-04-02

## 2025-04-02 PROCEDURE — 92557 COMPREHENSIVE HEARING TEST: CPT

## 2025-04-02 PROCEDURE — 92567 TYMPANOMETRY: CPT

## 2025-04-02 PROCEDURE — 99214 OFFICE O/P EST MOD 30 MIN: CPT

## 2025-06-19 ENCOUNTER — NON-APPOINTMENT (OUTPATIENT)
Age: 50
End: 2025-06-19

## 2025-06-20 ENCOUNTER — APPOINTMENT (OUTPATIENT)
Dept: OTOLARYNGOLOGY | Facility: CLINIC | Age: 50
End: 2025-06-20

## 2025-06-20 VITALS
SYSTOLIC BLOOD PRESSURE: 125 MMHG | HEIGHT: 70 IN | DIASTOLIC BLOOD PRESSURE: 77 MMHG | BODY MASS INDEX: 25.05 KG/M2 | HEART RATE: 69 BPM | WEIGHT: 175 LBS

## 2025-06-20 PROCEDURE — 92567 TYMPANOMETRY: CPT

## 2025-06-20 PROCEDURE — 36415 COLL VENOUS BLD VENIPUNCTURE: CPT

## 2025-06-20 PROCEDURE — 92557 COMPREHENSIVE HEARING TEST: CPT

## 2025-06-20 PROCEDURE — 99213 OFFICE O/P EST LOW 20 MIN: CPT

## 2025-06-23 ENCOUNTER — NON-APPOINTMENT (OUTPATIENT)
Age: 50
End: 2025-06-23

## 2025-06-24 ENCOUNTER — NON-APPOINTMENT (OUTPATIENT)
Age: 50
End: 2025-06-24

## 2025-06-24 LAB
ANION GAP SERPL CALC-SCNC: 19 MMOL/L
BASOPHILS # BLD AUTO: 0.03 K/UL
BASOPHILS NFR BLD AUTO: 0.8 %
BUN SERPL-MCNC: 24 MG/DL
CALCIUM SERPL-MCNC: 9.9 MG/DL
CHLORIDE SERPL-SCNC: 102 MMOL/L
CO2 SERPL-SCNC: 21 MMOL/L
CREAT SERPL-MCNC: 1.14 MG/DL
CRP SERPL-MCNC: <3 MG/L
EGFRCR SERPLBLD CKD-EPI 2021: 78 ML/MIN/1.73M2
EOSINOPHIL # BLD AUTO: 0.24 K/UL
EOSINOPHIL NFR BLD AUTO: 6 %
GLUCOSE SERPL-MCNC: 23 MG/DL
HCT VFR BLD CALC: 45.3 %
HGB BLD-MCNC: 14.6 G/DL
IMM GRANULOCYTES NFR BLD AUTO: 0 %
LYMPHOCYTES # BLD AUTO: 1.55 K/UL
LYMPHOCYTES NFR BLD AUTO: 38.9 %
MAN DIFF?: NORMAL
MCHC RBC-ENTMCNC: 29.9 PG
MCHC RBC-ENTMCNC: 32.2 G/DL
MCV RBC AUTO: 92.6 FL
MONOCYTES # BLD AUTO: 0.43 K/UL
MONOCYTES NFR BLD AUTO: 10.8 %
NEUTROPHILS # BLD AUTO: 1.73 K/UL
NEUTROPHILS NFR BLD AUTO: 43.5 %
PLATELET # BLD AUTO: 227 K/UL
POTASSIUM SERPL-SCNC: 4 MMOL/L
RBC # BLD: 4.89 M/UL
RBC # FLD: 12.6 %
SODIUM SERPL-SCNC: 142 MMOL/L
WBC # FLD AUTO: 3.98 K/UL

## 2025-07-07 PROBLEM — R42 ACUTE ONSET OF SEVERE VERTIGO: Status: ACTIVE | Noted: 2025-07-07

## 2025-07-07 RX ORDER — PREDNISONE 10 MG/1
10 TABLET ORAL
Qty: 49 | Refills: 0 | Status: ACTIVE | COMMUNITY
Start: 2025-07-07 | End: 1900-01-01

## 2025-07-16 ENCOUNTER — APPOINTMENT (OUTPATIENT)
Dept: OTOLARYNGOLOGY | Facility: CLINIC | Age: 50
End: 2025-07-16

## 2025-08-04 ENCOUNTER — RX RENEWAL (OUTPATIENT)
Age: 50
End: 2025-08-04

## 2025-08-08 ENCOUNTER — APPOINTMENT (OUTPATIENT)
Dept: PHARMACY | Facility: CLINIC | Age: 50
End: 2025-08-08
Payer: SELF-PAY

## 2025-08-08 PROCEDURE — V5299A: CUSTOM

## 2025-08-13 ENCOUNTER — APPOINTMENT (OUTPATIENT)
Dept: INTERNAL MEDICINE | Facility: CLINIC | Age: 50
End: 2025-08-13
Payer: COMMERCIAL

## 2025-08-13 ENCOUNTER — NON-APPOINTMENT (OUTPATIENT)
Age: 50
End: 2025-08-13

## 2025-08-13 ENCOUNTER — LABORATORY RESULT (OUTPATIENT)
Age: 50
End: 2025-08-13

## 2025-08-13 VITALS
DIASTOLIC BLOOD PRESSURE: 70 MMHG | HEIGHT: 70 IN | BODY MASS INDEX: 24.77 KG/M2 | SYSTOLIC BLOOD PRESSURE: 110 MMHG | RESPIRATION RATE: 14 BRPM | TEMPERATURE: 98.5 F | HEART RATE: 72 BPM | OXYGEN SATURATION: 98 % | WEIGHT: 173 LBS

## 2025-08-13 DIAGNOSIS — Z87.891 PERSONAL HISTORY OF NICOTINE DEPENDENCE: ICD-10-CM

## 2025-08-13 DIAGNOSIS — E55.9 VITAMIN D DEFICIENCY, UNSPECIFIED: ICD-10-CM

## 2025-08-13 DIAGNOSIS — Z00.00 ENCOUNTER FOR GENERAL ADULT MEDICAL EXAMINATION W/OUT ABNORMAL FINDINGS: ICD-10-CM

## 2025-08-13 PROCEDURE — 99386 PREV VISIT NEW AGE 40-64: CPT

## 2025-08-14 LAB
25(OH)D3 SERPL-MCNC: 62.2 NG/ML
ALBUMIN SERPL ELPH-MCNC: 4.6 G/DL
ALP BLD-CCNC: 72 U/L
ALT SERPL-CCNC: 23 U/L
ANION GAP SERPL CALC-SCNC: 16 MMOL/L
AST SERPL-CCNC: 25 U/L
BILIRUB SERPL-MCNC: 0.8 MG/DL
BUN SERPL-MCNC: 24 MG/DL
CALCIUM SERPL-MCNC: 10.5 MG/DL
CHLORIDE SERPL-SCNC: 100 MMOL/L
CHOLEST SERPL-MCNC: 240 MG/DL
CO2 SERPL-SCNC: 23 MMOL/L
CREAT SERPL-MCNC: 1.17 MG/DL
EGFRCR SERPLBLD CKD-EPI 2021: 75 ML/MIN/1.73M2
ESTIMATED AVERAGE GLUCOSE: 103 MG/DL
GLUCOSE SERPL-MCNC: 83 MG/DL
HBA1C MFR BLD HPLC: 5.2 %
HCT VFR BLD CALC: 46.8 %
HDLC SERPL-MCNC: 64 MG/DL
HGB BLD-MCNC: 15.8 G/DL
LDLC SERPL-MCNC: 164 MG/DL
MCHC RBC-ENTMCNC: 30 PG
MCHC RBC-ENTMCNC: 33.8 G/DL
MCV RBC AUTO: 88.8 FL
NONHDLC SERPL-MCNC: 176 MG/DL
PLATELET # BLD AUTO: 254 K/UL
POTASSIUM SERPL-SCNC: 4.3 MMOL/L
PROT SERPL-MCNC: 7 G/DL
PSA SERPL-MCNC: 1.04 NG/ML
RBC # BLD: 5.27 M/UL
RBC # FLD: 13 %
SODIUM SERPL-SCNC: 139 MMOL/L
TRIGL SERPL-MCNC: 73 MG/DL
TSH SERPL-ACNC: 2.19 UIU/ML
WBC # FLD AUTO: 5.59 K/UL

## 2025-09-10 ENCOUNTER — APPOINTMENT (OUTPATIENT)
Dept: OTOLARYNGOLOGY | Facility: CLINIC | Age: 50
End: 2025-09-10
Payer: COMMERCIAL

## 2025-09-10 VITALS
HEIGHT: 70 IN | SYSTOLIC BLOOD PRESSURE: 119 MMHG | WEIGHT: 173 LBS | BODY MASS INDEX: 24.77 KG/M2 | DIASTOLIC BLOOD PRESSURE: 79 MMHG

## 2025-09-10 DIAGNOSIS — H90.3 SENSORINEURAL HEARING LOSS, BILATERAL: ICD-10-CM

## 2025-09-10 DIAGNOSIS — H83.8X9 OTHER SPECIFIED DISEASES OF INNER EAR, UNSPECIFIED EAR: ICD-10-CM

## 2025-09-10 PROCEDURE — 92557 COMPREHENSIVE HEARING TEST: CPT

## 2025-09-10 PROCEDURE — 92567 TYMPANOMETRY: CPT

## 2025-09-10 PROCEDURE — 99214 OFFICE O/P EST MOD 30 MIN: CPT
